# Patient Record
Sex: FEMALE | Race: WHITE | NOT HISPANIC OR LATINO | Employment: OTHER | URBAN - METROPOLITAN AREA
[De-identification: names, ages, dates, MRNs, and addresses within clinical notes are randomized per-mention and may not be internally consistent; named-entity substitution may affect disease eponyms.]

---

## 2020-09-23 ENCOUNTER — APPOINTMENT (EMERGENCY)
Dept: RADIOLOGY | Facility: HOSPITAL | Age: 35
DRG: 420 | End: 2020-09-23
Payer: COMMERCIAL

## 2020-09-23 ENCOUNTER — HOSPITAL ENCOUNTER (INPATIENT)
Facility: HOSPITAL | Age: 35
LOS: 2 days | Discharge: HOME | DRG: 420 | End: 2020-09-25
Attending: EMERGENCY MEDICINE | Admitting: INTERNAL MEDICINE
Payer: COMMERCIAL

## 2020-09-23 DIAGNOSIS — E87.2 INCREASED ANION GAP METABOLIC ACIDOSIS: ICD-10-CM

## 2020-09-23 DIAGNOSIS — E83.51 HYPOCALCEMIA: ICD-10-CM

## 2020-09-23 DIAGNOSIS — E11.10 DKA (DIABETIC KETOACIDOSES): Primary | ICD-10-CM

## 2020-09-23 LAB
ALBUMIN SERPL BCP-MCNC: 4.9 G/DL (ref 3.5–5)
ALP SERPL-CCNC: 113 U/L (ref 46–116)
ALT SERPL W P-5'-P-CCNC: 28 U/L (ref 12–78)
ANION GAP SERPL CALCULATED.3IONS-SCNC: 11 MMOL/L (ref 4–13)
ANION GAP SERPL CALCULATED.3IONS-SCNC: 16 MMOL/L (ref 4–13)
ANION GAP SERPL CALCULATED.3IONS-SCNC: 22 MMOL/L (ref 4–13)
AST SERPL W P-5'-P-CCNC: 19 U/L (ref 5–45)
BACTERIA UR QL AUTO: NORMAL /HPF
BASE EX.OXY STD BLDV CALC-SCNC: 82.2 % (ref 60–80)
BASE EXCESS BLDV CALC-SCNC: -12.8 MMOL/L
BASOPHILS # BLD AUTO: 0.03 THOUSANDS/ΜL (ref 0–0.1)
BASOPHILS NFR BLD AUTO: 0 % (ref 0–1)
BETA-HYDROXYBUTYRATE: 3.9 MMOL/L
BILIRUB SERPL-MCNC: 0.49 MG/DL (ref 0.2–1)
BILIRUB UR QL STRIP: ABNORMAL
BUN SERPL-MCNC: 14 MG/DL (ref 5–25)
BUN SERPL-MCNC: 19 MG/DL (ref 5–25)
BUN SERPL-MCNC: 20 MG/DL (ref 5–25)
CALCIUM SERPL-MCNC: 6.9 MG/DL (ref 8.3–10.1)
CALCIUM SERPL-MCNC: 8 MG/DL (ref 8.3–10.1)
CALCIUM SERPL-MCNC: 9.6 MG/DL (ref 8.3–10.1)
CHLORIDE SERPL-SCNC: 106 MMOL/L (ref 100–108)
CHLORIDE SERPL-SCNC: 111 MMOL/L (ref 100–108)
CHLORIDE SERPL-SCNC: 99 MMOL/L (ref 100–108)
CLARITY UR: CLEAR
CO2 SERPL-SCNC: 15 MMOL/L (ref 21–32)
CO2 SERPL-SCNC: 17 MMOL/L (ref 21–32)
CO2 SERPL-SCNC: 19 MMOL/L (ref 21–32)
COLOR UR: YELLOW
CREAT SERPL-MCNC: 0.6 MG/DL (ref 0.6–1.3)
CREAT SERPL-MCNC: 0.7 MG/DL (ref 0.6–1.3)
CREAT SERPL-MCNC: 1.09 MG/DL (ref 0.6–1.3)
D DIMER PPP FEU-MCNC: <0.27 UG/ML FEU
EOSINOPHIL # BLD AUTO: 0 THOUSAND/ΜL (ref 0–0.61)
EOSINOPHIL NFR BLD AUTO: 0 % (ref 0–6)
ERYTHROCYTE [DISTWIDTH] IN BLOOD BY AUTOMATED COUNT: 12.5 % (ref 11.6–15.1)
EXT PREG TEST URINE: NEGATIVE
EXT. CONTROL ED NAV: NORMAL
GFR SERPL CREATININE-BSD FRML MDRD: 114 ML/MIN/1.73SQ M
GFR SERPL CREATININE-BSD FRML MDRD: 120 ML/MIN/1.73SQ M
GFR SERPL CREATININE-BSD FRML MDRD: 67 ML/MIN/1.73SQ M
GLUCOSE SERPL-MCNC: 114 MG/DL (ref 65–140)
GLUCOSE SERPL-MCNC: 124 MG/DL (ref 65–140)
GLUCOSE SERPL-MCNC: 128 MG/DL (ref 65–140)
GLUCOSE SERPL-MCNC: 138 MG/DL (ref 65–140)
GLUCOSE SERPL-MCNC: 170 MG/DL (ref 65–140)
GLUCOSE SERPL-MCNC: 187 MG/DL (ref 65–140)
GLUCOSE SERPL-MCNC: 223 MG/DL (ref 65–140)
GLUCOSE UR STRIP-MCNC: ABNORMAL MG/DL
HCG SERPL QL: NEGATIVE
HCO3 BLDV-SCNC: 13.1 MMOL/L (ref 24–30)
HCT VFR BLD AUTO: 49.2 % (ref 34.8–46.1)
HGB BLD-MCNC: 16.1 G/DL (ref 11.5–15.4)
HGB UR QL STRIP.AUTO: ABNORMAL
IMM GRANULOCYTES # BLD AUTO: 0.06 THOUSAND/UL (ref 0–0.2)
IMM GRANULOCYTES NFR BLD AUTO: 1 % (ref 0–2)
KETONES UR STRIP-MCNC: ABNORMAL MG/DL
LEUKOCYTE ESTERASE UR QL STRIP: NEGATIVE
LIPASE SERPL-CCNC: 529 U/L (ref 73–393)
LYMPHOCYTES # BLD AUTO: 1.01 THOUSANDS/ΜL (ref 0.6–4.47)
LYMPHOCYTES NFR BLD AUTO: 8 % (ref 14–44)
MAGNESIUM SERPL-MCNC: 1.4 MG/DL (ref 1.6–2.6)
MAGNESIUM SERPL-MCNC: 1.8 MG/DL (ref 1.6–2.6)
MCH RBC QN AUTO: 27.2 PG (ref 26.8–34.3)
MCHC RBC AUTO-ENTMCNC: 32.7 G/DL (ref 31.4–37.4)
MCV RBC AUTO: 83 FL (ref 82–98)
MONOCYTES # BLD AUTO: 0.26 THOUSAND/ΜL (ref 0.17–1.22)
MONOCYTES NFR BLD AUTO: 2 % (ref 4–12)
NEUTROPHILS # BLD AUTO: 10.99 THOUSANDS/ΜL (ref 1.85–7.62)
NEUTS SEG NFR BLD AUTO: 89 % (ref 43–75)
NITRITE UR QL STRIP: NEGATIVE
NON-SQ EPI CELLS URNS QL MICRO: NORMAL /HPF
NRBC BLD AUTO-RTO: 0 /100 WBCS
O2 CT BLDV-SCNC: 19 ML/DL
PCO2 BLDV: 31.2 MM HG (ref 42–50)
PH BLDV: 7.24 [PH] (ref 7.3–7.4)
PH UR STRIP.AUTO: 5.5 [PH]
PHOSPHATE SERPL-MCNC: 1.9 MG/DL (ref 2.7–4.5)
PLATELET # BLD AUTO: 416 THOUSANDS/UL (ref 149–390)
PMV BLD AUTO: 9.9 FL (ref 8.9–12.7)
PO2 BLDV: 50.3 MM HG (ref 35–45)
POTASSIUM SERPL-SCNC: 3.5 MMOL/L (ref 3.5–5.3)
POTASSIUM SERPL-SCNC: 4.1 MMOL/L (ref 3.5–5.3)
POTASSIUM SERPL-SCNC: 4.1 MMOL/L (ref 3.5–5.3)
PROT SERPL-MCNC: 9.4 G/DL (ref 6.4–8.2)
PROT UR STRIP-MCNC: ABNORMAL MG/DL
RBC # BLD AUTO: 5.92 MILLION/UL (ref 3.81–5.12)
RBC #/AREA URNS AUTO: NORMAL /HPF
SODIUM SERPL-SCNC: 136 MMOL/L (ref 136–145)
SODIUM SERPL-SCNC: 139 MMOL/L (ref 136–145)
SODIUM SERPL-SCNC: 141 MMOL/L (ref 136–145)
SP GR UR STRIP.AUTO: >=1.03 (ref 1–1.03)
UROBILINOGEN UR QL STRIP.AUTO: 0.2 E.U./DL
WBC # BLD AUTO: 12.35 THOUSAND/UL (ref 4.31–10.16)
WBC #/AREA URNS AUTO: NORMAL /HPF

## 2020-09-23 PROCEDURE — 81001 URINALYSIS AUTO W/SCOPE: CPT | Performed by: PHYSICIAN ASSISTANT

## 2020-09-23 PROCEDURE — 83735 ASSAY OF MAGNESIUM: CPT | Performed by: PHYSICIAN ASSISTANT

## 2020-09-23 PROCEDURE — 83690 ASSAY OF LIPASE: CPT | Performed by: PHYSICIAN ASSISTANT

## 2020-09-23 PROCEDURE — 82010 KETONE BODYS QUAN: CPT | Performed by: PHYSICIAN ASSISTANT

## 2020-09-23 PROCEDURE — 85379 FIBRIN DEGRADATION QUANT: CPT | Performed by: PHYSICIAN ASSISTANT

## 2020-09-23 PROCEDURE — 96361 HYDRATE IV INFUSION ADD-ON: CPT

## 2020-09-23 PROCEDURE — 84100 ASSAY OF PHOSPHORUS: CPT | Performed by: INTERNAL MEDICINE

## 2020-09-23 PROCEDURE — 80048 BASIC METABOLIC PNL TOTAL CA: CPT | Performed by: INTERNAL MEDICINE

## 2020-09-23 PROCEDURE — 82948 REAGENT STRIP/BLOOD GLUCOSE: CPT

## 2020-09-23 PROCEDURE — 99285 EMERGENCY DEPT VISIT HI MDM: CPT

## 2020-09-23 PROCEDURE — 36415 COLL VENOUS BLD VENIPUNCTURE: CPT | Performed by: PHYSICIAN ASSISTANT

## 2020-09-23 PROCEDURE — 96374 THER/PROPH/DIAG INJ IV PUSH: CPT

## 2020-09-23 PROCEDURE — 83735 ASSAY OF MAGNESIUM: CPT | Performed by: INTERNAL MEDICINE

## 2020-09-23 PROCEDURE — 99223 1ST HOSP IP/OBS HIGH 75: CPT | Performed by: INTERNAL MEDICINE

## 2020-09-23 PROCEDURE — 82805 BLOOD GASES W/O2 SATURATION: CPT | Performed by: PHYSICIAN ASSISTANT

## 2020-09-23 PROCEDURE — 71046 X-RAY EXAM CHEST 2 VIEWS: CPT

## 2020-09-23 PROCEDURE — 80048 BASIC METABOLIC PNL TOTAL CA: CPT | Performed by: PHYSICIAN ASSISTANT

## 2020-09-23 PROCEDURE — 84703 CHORIONIC GONADOTROPIN ASSAY: CPT | Performed by: INTERNAL MEDICINE

## 2020-09-23 PROCEDURE — 81025 URINE PREGNANCY TEST: CPT | Performed by: PHYSICIAN ASSISTANT

## 2020-09-23 PROCEDURE — 85025 COMPLETE CBC W/AUTO DIFF WBC: CPT | Performed by: PHYSICIAN ASSISTANT

## 2020-09-23 PROCEDURE — 80053 COMPREHEN METABOLIC PANEL: CPT | Performed by: PHYSICIAN ASSISTANT

## 2020-09-23 PROCEDURE — 99291 CRITICAL CARE FIRST HOUR: CPT | Performed by: EMERGENCY MEDICINE

## 2020-09-23 RX ORDER — POTASSIUM CHLORIDE 20 MEQ/1
40 TABLET, EXTENDED RELEASE ORAL ONCE
Status: COMPLETED | OUTPATIENT
Start: 2020-09-23 | End: 2020-09-23

## 2020-09-23 RX ORDER — SODIUM CHLORIDE 9 MG/ML
500 INJECTION, SOLUTION INTRAVENOUS CONTINUOUS
Status: DISCONTINUED | OUTPATIENT
Start: 2020-09-23 | End: 2020-09-24

## 2020-09-23 RX ORDER — NICOTINE POLACRILEX 2 MG
GUM BUCCAL
COMMUNITY

## 2020-09-23 RX ORDER — ONDANSETRON 2 MG/ML
4 INJECTION INTRAMUSCULAR; INTRAVENOUS EVERY 4 HOURS PRN
Status: DISCONTINUED | OUTPATIENT
Start: 2020-09-23 | End: 2020-09-25 | Stop reason: HOSPADM

## 2020-09-23 RX ORDER — SODIUM CHLORIDE 9 MG/ML
2000 INJECTION, SOLUTION INTRAVENOUS CONTINUOUS
Status: DISPENSED | OUTPATIENT
Start: 2020-09-23 | End: 2020-09-23

## 2020-09-23 RX ORDER — ONDANSETRON 2 MG/ML
4 INJECTION INTRAMUSCULAR; INTRAVENOUS ONCE
Status: COMPLETED | OUTPATIENT
Start: 2020-09-23 | End: 2020-09-23

## 2020-09-23 RX ORDER — MAGNESIUM SULFATE HEPTAHYDRATE 40 MG/ML
4 INJECTION, SOLUTION INTRAVENOUS ONCE
Status: COMPLETED | OUTPATIENT
Start: 2020-09-23 | End: 2020-09-24

## 2020-09-23 RX ORDER — DEXTROSE AND SODIUM CHLORIDE 5; .9 G/100ML; G/100ML
100 INJECTION, SOLUTION INTRAVENOUS CONTINUOUS
Status: DISCONTINUED | OUTPATIENT
Start: 2020-09-23 | End: 2020-09-24

## 2020-09-23 RX ORDER — DIPHENOXYLATE HYDROCHLORIDE AND ATROPINE SULFATE 2.5; .025 MG/1; MG/1
1 TABLET ORAL DAILY
COMMUNITY

## 2020-09-23 RX ORDER — SODIUM CHLORIDE 9 MG/ML
250 INJECTION, SOLUTION INTRAVENOUS CONTINUOUS
Status: DISCONTINUED | OUTPATIENT
Start: 2020-09-24 | End: 2020-09-24

## 2020-09-23 RX ORDER — MAGNESIUM SULFATE HEPTAHYDRATE 40 MG/ML
2 INJECTION, SOLUTION INTRAVENOUS ONCE
Status: COMPLETED | OUTPATIENT
Start: 2020-09-23 | End: 2020-09-23

## 2020-09-23 RX ORDER — ONDANSETRON 2 MG/ML
INJECTION INTRAMUSCULAR; INTRAVENOUS
Status: COMPLETED
Start: 2020-09-23 | End: 2020-09-23

## 2020-09-23 RX ADMIN — SODIUM CHLORIDE 2000 ML/HR: 0.9 INJECTION, SOLUTION INTRAVENOUS at 19:23

## 2020-09-23 RX ADMIN — SODIUM CHLORIDE 1000 ML: 0.9 INJECTION, SOLUTION INTRAVENOUS at 16:25

## 2020-09-23 RX ADMIN — DEXTROSE AND SODIUM CHLORIDE 250 ML/HR: 5; .9 INJECTION, SOLUTION INTRAVENOUS at 19:57

## 2020-09-23 RX ADMIN — ONDANSETRON 4 MG: 2 INJECTION INTRAMUSCULAR; INTRAVENOUS at 16:27

## 2020-09-23 RX ADMIN — MAGNESIUM SULFATE IN WATER 2 G: 40 INJECTION, SOLUTION INTRAVENOUS at 21:40

## 2020-09-23 RX ADMIN — SODIUM CHLORIDE 500 ML/HR: 0.9 INJECTION, SOLUTION INTRAVENOUS at 22:07

## 2020-09-23 RX ADMIN — MAGNESIUM SULFATE IN WATER 4 G: 40 INJECTION, SOLUTION INTRAVENOUS at 22:38

## 2020-09-23 RX ADMIN — ONDANSETRON 4 MG: 2 INJECTION INTRAMUSCULAR; INTRAVENOUS at 22:36

## 2020-09-23 RX ADMIN — SODIUM CHLORIDE 5.7 UNITS/HR: 9 INJECTION, SOLUTION INTRAVENOUS at 19:55

## 2020-09-23 RX ADMIN — SODIUM CHLORIDE 1000 ML: 0.9 INJECTION, SOLUTION INTRAVENOUS at 17:57

## 2020-09-23 RX ADMIN — SODIUM PHOSPHATE, MONOBASIC, MONOHYDRATE 21 MMOL: 276; 142 INJECTION, SOLUTION INTRAVENOUS at 21:46

## 2020-09-23 RX ADMIN — SODIUM CHLORIDE 500 ML/HR: 0.9 INJECTION, SOLUTION INTRAVENOUS at 21:23

## 2020-09-23 RX ADMIN — POTASSIUM CHLORIDE 40 MEQ: 1500 TABLET, EXTENDED RELEASE ORAL at 21:25

## 2020-09-23 NOTE — ED ATTENDING ATTESTATION
9/23/2020  IKinjal DO, saw and evaluated the patient  I have discussed the patient with the resident/non-physician practitioner and agree with the resident's/non-physician practitioner's findings, Plan of Care, and MDM as documented in the resident's/non-physician practitioner's note, except where noted  All available labs and Radiology studies were reviewed  I was present for key portions of any procedure(s) performed by the resident/non-physician practitioner and I was immediately available to provide assistance  At this point I agree with the current assessment done in the Emergency Department  I have conducted an independent evaluation of this patient a history and physical is as follows:    ED Course    30-year-old female with a history of insulin-dependent diabetes presents the emergency department for evaluation of hyperglycemia, nausea vomiting and diarrhea  Patient states that she was diagnosed with diabetes at the age of 5  She has had good control of her blood sugars with averages between 100 in 200  She denies previous history of diabetic ketoacidosis  She wears an insulin pump  Patient states that she began to have fatigue with nausea vomiting yesterday  When she checked her blood sugar was greater than 400  She has had 1 episode of diarrhea  She has associated mild generalized abdominal discomfort  She took over-the-counter medications without relief  She denies fevers or chills  She does have increased thirst and increased urination  Past medical history:  Insulin-dependent diabetes    Physical exam:  Patient has dry mucous membranes  Acetone sent is present on her breath  She is mildly tachypneic  Tachycardia is present  Heart is regular  Lungs are clear to auscultation bilaterally  Abdomen is soft with diffuse tenderness but no rebound or guarding  Hyperactive bowel sounds are present  Patient has no focal neurologic deficits    She has 5/5 strength in all 4 extremities  Plan:  Suspect patient is experiencing symptoms secondary to DKA  Will start IV fluids  Patient's blood sugar was less than 250 on initial testing  Patient will be placed on insulin drip and glucose containing IV fluids until her anion gap closes  Potassium is within normal limits    Will discuss case with Critical Care and admit the patient to the hospital         Critical Care Time  CriticalCare Time  Performed by: Morgan August DO  Authorized by: Morgan August DO     Critical care provider statement:     Critical care time (minutes):  30    Critical care time was exclusive of:  Separately billable procedures and treating other patients and teaching time    Critical care was necessary to treat or prevent imminent or life-threatening deterioration of the following conditions:  Dehydration    Critical care was time spent personally by me on the following activities:  Blood draw for specimens, obtaining history from patient or surrogate, development of treatment plan with patient or surrogate, discussions with consultants, evaluation of patient's response to treatment, examination of patient, ordering and review of laboratory studies, ordering and performing treatments and interventions, ordering and review of radiographic studies, re-evaluation of patient's condition and review of old charts    I assumed direction of critical care for this patient from another provider in my specialty: no

## 2020-09-23 NOTE — ASSESSMENT & PLAN NOTE
· POA, lipase elevated at 529 on admission  Likely secondary to nausea/vomiting  · Currently without abdominal pain or tenderness  · Will continue to monitor, continue IV fluids    Diabetic clear liquid diet for now

## 2020-09-23 NOTE — H&P
H&P- Johann Wren 1985, 37 y o  female MRN: [id number]    Unit/Bed#: FT 03 Encounter: [id number]    Primary Care Provider: No primary care provider on file  Date and time admitted to hospital: 9/23/2020  3:54 PM    * Diabetic ketoacidosis without coma associated with type 1 diabetes mellitus [facility])  Assessment & Plan  Lab Results   Component Value Date    HGBA1C 8 9 (H) 03/11/2020     Recent Labs     09/23/20  1739   POCGLU 187*     Blood Sugar Average: Last 72 hrs:  · (P) 187   · Patient with history of type 1 diabetes on insulin pump presenting with a days history nausea, vomiting  Also reports uncontrolled blood glucose since onset  · Workup in the ED revealed elevated blood glucose in the 200s with positive beta hydroxybutyrate, elevated anion gap at 22  · She will be admitted to step-down level 2, start on insulin infusion DKA protocol, monitor electrolytes closely and replete as indicated per protocol  · Will discontinue insulin pump while on infusion protocol  · She follows with endocrinology at [facility]  Will obtain endocrinology eval    Elevated lipase  Assessment & Plan  · POA, lipase elevated at 529 on admission  Likely secondary to nausea/vomiting  · Currently without abdominal pain or tenderness  · Will continue to monitor, continue IV fluids  Diabetic clear liquid diet for now    VTE Prophylaxis:   Low risk, ambulate     Code Status:  Full code    Anticipated Length of Stay:  Patient will be admitted on an Inpatient basis with an anticipated length of stay of  > 2 midnights  Justification for Hospital Stay:  DKA    Chief Complaint:     Nausea with vomiting and generalized malaise    History of Present Illness:  Johann Wren is a 37 y o  female who presents with 1 day history of generalized malaise with associated nausea and vomiting  She denies any abdominal pain  She also reports her sugars have been elevated above her normal baseline    She initially touch there was a problem with her insulin pump and proceeded to change it  She continue to have symptoms with elevated blood glucose to the 400s  She denied any hematemesis, no diarrhea, no constipation  She denies any fever or chills  She attempted some Pepto-Bismol without relief  Patient subsequently presented to the ED and was noted to be in DKA  She denied any sick contacts, no recent travel  She denies any URI or  symptoms  At the time of my evaluation, she reported feeling overall improved  Review of Systems   Constitutional: Positive for fatigue  Negative for chills, diaphoresis and fever  HENT: Negative  Respiratory: Negative for cough, chest tightness, shortness of breath and wheezing  Cardiovascular: Negative  Gastrointestinal: Positive for nausea and vomiting  Negative for abdominal pain, constipation and diarrhea  Genitourinary: Negative  Musculoskeletal: Negative  Neurological: Negative  Psychiatric/Behavioral: Negative  All other systems reviewed and are negative  Past Medical History:   Diagnosis Date    Diabetes mellitus ([facility] )        History reviewed  No pertinent surgical history  Family History:  Family History   Problem Relation Age of Onset    Lung disease Maternal Grandfather        Home Meds:  all medications and allergies reviewed    Allergies:    Allergies   Allergen Reactions    Augmentin [Amoxicillin-Pot Clavulanate] Rash     Marital Status: Single     Social History     Substance and Sexual Activity   Alcohol Use No     Social History     Tobacco Use   Smoking Status Never Smoker   Smokeless Tobacco Never Used     Social History     Substance and Sexual Activity   Drug Use No     Physical Exam:   Vitals:   Blood Pressure: 112/77 (09/23/20 1735)  Pulse: 99 (09/23/20 1735)  Temperature: 97 7 °F (36 5 °C) (09/23/20 1517)  Temp Source: Oral (09/23/20 1517)  Respirations: 18 (09/23/20 1735)  Weight - Scale: 56 5 kg (124 lb 9 6 oz) (09/23/20 1517)  SpO2: 100 % (09/23/20 1735)    Physical Exam  Vitals signs reviewed  Constitutional:       General: She is not in acute distress  Appearance: Normal appearance  She is not ill-appearing, toxic-appearing or diaphoretic  HENT:      Head: Normocephalic and atraumatic  Nose: Nose normal  No congestion or rhinorrhea  Mouth/Throat:      Mouth: Mucous membranes are dry  Eyes:      General: No scleral icterus  Right eye: No discharge  Left eye: No discharge  Extraocular Movements: Extraocular movements intact  Neck:      Musculoskeletal: Normal range of motion and neck supple  Cardiovascular:      Rate and Rhythm: Regular rhythm  Tachycardia present  Heart sounds: No murmur  Pulmonary:      Effort: Pulmonary effort is normal  No respiratory distress  Breath sounds: Normal breath sounds  No wheezing, rhonchi or rales  Abdominal:      General: There is no distension  Palpations: Abdomen is soft  Tenderness: There is no abdominal tenderness  Musculoskeletal: Normal range of motion  Right lower leg: No edema  Left lower leg: No edema  Skin:     General: Skin is warm and dry  Comments: + vitiligo   Neurological:      General: No focal deficit present  Mental Status: She is alert and oriented to person, place, and time  Mental status is at baseline  Cranial Nerves: No cranial nerve deficit  Lab Results: I have personally reviewed pertinent reports        Results from last 7 days   Lab Units 09/23/20  1624   WBC Thousand/uL 12 35*   HEMOGLOBIN g/dL 16 1*   HEMATOCRIT % 49 2*   PLATELETS Thousands/uL 416*   NEUTROS PCT % 89*   LYMPHS PCT % 8*   MONOS PCT % 2*   EOS PCT % 0     Results from last 7 days   Lab Units 09/23/20  1806 09/23/20  1624   POTASSIUM mmol/L 4 1 4 1   CHLORIDE mmol/L 106 99*   CO2 mmol/L 17* 15*   BUN mg/dL 19 20   CREATININE mg/dL 0 70 1 09   CALCIUM mg/dL 8 0* 9 6   ALK PHOS U/L  --  113   ALT U/L  --  28   AST U/L --  19           Imaging: I have personally reviewed pertinent reports  Xr Chest 2 Views    Result Date: 9/23/2020  Narrative: CHEST INDICATION:   SOB  COMPARISON:  None EXAM PERFORMED/VIEWS:  XR CHEST PA & LATERAL FINDINGS: Cardiomediastinal silhouette appears unremarkable  The lungs are clear  No pneumothorax or pleural effusion  Osseous structures appear within normal limits for patient age  Impression: No acute cardiopulmonary disease  Workstation performed: [id number]     ** Please Note: Dragon 360 Dictation voice to text software may have been used in the creation of this document   **

## 2020-09-23 NOTE — ASSESSMENT & PLAN NOTE
Lab Results   Component Value Date    HGBA1C 8 9 (H) 03/11/2020     Recent Labs     09/23/20  1739   POCGLU 187*     Blood Sugar Average: Last 72 hrs:  · (P) 187   · Patient with history of type 1 diabetes on insulin pump presenting with a days history nausea, vomiting  Also reports uncontrolled blood glucose since onset  · Workup in the ED revealed elevated blood glucose in the 200s with positive beta hydroxybutyrate, elevated anion gap at 22  · She will be admitted to step-down level 2, start on insulin infusion DKA protocol, monitor electrolytes closely and replete as indicated per protocol  · Will discontinue insulin pump while on infusion protocol  · She follows with endocrinology at [facility]    Will obtain endocrinology eval

## 2020-09-23 NOTE — ED PROVIDER NOTES
History  Chief Complaint   Patient presents with    Vomiting     PT reports "not feeling well since yesterday and sugars all over the place (400's) and today started vomiting"     Pt is a 34yo female with a PMH significant for type 1 DM on an insulin pump who presents to the ER with complaints of nausea and vomiting x 1 day  Patient states her symptoms began as fatigue and exhaustion yesterday morning  She checked her blood sugar when she woke and noted that it was elevated to the 400s  Patient states that her blood sugars are normally very well controlled within the 100 range  Patient's exhaustion continued throughout the day yesterday and her blood sugars remain high  She woke up this morning feeling nauseous and had several episodes of vomiting and 1 episode of diarrhea  Patient also admits to generalized, nonspecific abdominal pain  Patient tried a dose of Pepto-Bismol without relief of symptoms  Patient states she was unable to get her blood sugars below 230 today  Patient states she called her family doctor who advised she come to the ER for further evaluation  Upon arrival to the ER, the patient is complaining of nausea  She also states she has some shortness of breath  Upon questioning the patient states she has been waking up several times throughout the night to urinate  Admits to increased thirst   Pt denies any fevers, hematochezia, hematemesis, chest pain, palpitations, dizziness, lightheadedness, episodes of passing out, rashes, leg pain, leg swelling  History provided by:  Patient   used: No        Prior to Admission Medications   Prescriptions Last Dose Informant Patient Reported? Taking? Biotin 1 MG CAPS   Yes Yes   Sig: Take by mouth   insulin aspart (NovoLOG) 100 units/mL injection   Yes Yes   Sig: Inject under the skin continuous   Via pump   insulin lispro (Admelog) 100 units/mL injection   Yes Yes   Sig: INJECT 60 UNITS VIA INSULIN PUMP DAILY multivitamin (THERAGRAN) TABS   Yes Yes   Sig: Take 1 tablet by mouth daily      Facility-Administered Medications: None       Past Medical History:   Diagnosis Date    Diabetes mellitus ([address] )        History reviewed  No pertinent surgical history  Family History   Problem Relation Age of Onset    Lung disease Maternal Grandfather      I have reviewed and agree with the history as documented  E-Cigarette/Vaping    E-Cigarette Use Never User      E-Cigarette/Vaping Substances     Social History     Tobacco Use    Smoking status: Never Smoker    Smokeless tobacco: Never Used   Substance Use Topics    Alcohol use: No    Drug use: No       Review of Systems   Constitutional: Positive for appetite change, chills and fatigue  Negative for activity change, diaphoresis, fever and unexpected weight change  HENT: Negative for congestion, facial swelling, postnasal drip, rhinorrhea, sinus pressure, sinus pain, sneezing, sore throat and trouble swallowing  Eyes: Negative for photophobia and visual disturbance  Respiratory: Positive for shortness of breath  Negative for cough, chest tightness and wheezing  Cardiovascular: Negative for chest pain, palpitations and leg swelling  Gastrointestinal: Positive for abdominal pain, diarrhea, nausea and vomiting  Negative for abdominal distention  Musculoskeletal: Positive for myalgias  Negative for back pain, gait problem, neck pain and neck stiffness  Skin: Negative for pallor, rash and wound  Neurological: Negative for dizziness, syncope, weakness, light-headedness and headaches  Psychiatric/Behavioral: Negative for agitation, behavioral problems, confusion and decreased concentration  The patient is not nervous/anxious  Physical Exam  Physical Exam  Constitutional:       General: She is in acute distress  Appearance: Normal appearance  She is normal weight  She is not ill-appearing, toxic-appearing or diaphoretic        Comments: Stanton Sterling female on the stretcher   In mild distress, appearing uncomfortable, tear and anxious over her symptoms   Non-toxic appearing    HENT:      Head: Normocephalic and atraumatic  Nose: Nose normal  No congestion or rhinorrhea  Mouth/Throat:      Mouth: Mucous membranes are moist       Pharynx: Oropharynx is clear  No oropharyngeal exudate or posterior oropharyngeal erythema  Eyes:      General: No scleral icterus  Right eye: No discharge  Left eye: No discharge  Extraocular Movements: Extraocular movements intact  Conjunctiva/sclera: Conjunctivae normal       Pupils: Pupils are equal, round, and reactive to light  Neck:      Musculoskeletal: Normal range of motion and neck supple  No neck rigidity or muscular tenderness  Cardiovascular:      Rate and Rhythm: Regular rhythm  Tachycardia present  Pulses: Normal pulses  Heart sounds: No murmur  Comments: Tachycardic  Regular rhythm    Pulmonary:      Effort: Pulmonary effort is normal  No respiratory distress  Breath sounds: Normal breath sounds  No wheezing, rhonchi or rales  Comments: No respiratory distress, no accessory muscle use   Breath sounds noted to be clear and equal lorene   No wheezes, rhonchi, rales   Abdominal:      General: Abdomen is flat  There is no distension  Palpations: Abdomen is soft  Tenderness: There is no abdominal tenderness  There is no right CVA tenderness, left CVA tenderness, guarding or rebound  Comments: Ab soft, NT, ND  No rebound or guarding    Musculoskeletal: Normal range of motion  General: No swelling, tenderness, deformity or signs of injury  Right lower leg: No edema  Left lower leg: No edema  Comments: lorene LE without edema or tenderness   Skin:     General: Skin is warm and dry  Capillary Refill: Capillary refill takes less than 2 seconds  Findings: No bruising, erythema, lesion or rash        Comments: No rashes Neurological:      General: No focal deficit present  Mental Status: She is alert and oriented to person, place, and time  Mental status is at baseline  Cranial Nerves: No cranial nerve deficit  Sensory: No sensory deficit  Motor: No weakness  Gait: Gait normal    Psychiatric:         Behavior: Behavior normal          Thought Content:  Thought content normal          Judgment: Judgment normal       Comments: Anxious and tearful          Vital Signs  ED Triage Vitals [09/23/20 1517]   Temperature Pulse Respirations Blood Pressure SpO2   97 7 °F (36 5 °C) (!) 127 18 119/77 99 %      Temp Source Heart Rate Source Patient Position - Orthostatic VS BP Location FiO2 (%)   Oral Monitor Sitting Left arm --      Pain Score       3           Vitals:    09/24/20 0022 09/24/20 0300 09/24/20 0800 09/24/20 0940   BP:  98/54 100/63 120/80   Pulse: 85 80 85 84   Patient Position - Orthostatic VS:             Visual Acuity      ED Medications  Medications   multivitamin-minerals (CENTRUM) tablet 1 tablet (1 tablet Oral Given 9/24/20 0840)   sodium chloride 0 9 % infusion (0 mL/hr Intravenous Stopped 9/23/20 2121)   dextrose 5 % and sodium chloride 0 9 % infusion (100 mL/hr Intravenous Rate/Dose Change 9/24/20 0628)   ondansetron (ZOFRAN) injection 4 mg (4 mg Intravenous Given 9/24/20 1000)   insulin regular (HumuLIN R,NovoLIN R) 1 Units/mL in sodium chloride 0 9 % 100 mL infusion (0 Units/hr Intravenous Stopped 9/24/20 1314)   insulin lispro (HumaLOG) FOR PUMP REFILLS 100 Units (has no administration in time range)   acetaminophen (TYLENOL) tablet 650 mg (650 mg Oral Given 9/24/20 1346)   sodium chloride 0 9 % bolus 1,000 mL (0 mL Intravenous Stopped 9/23/20 1756)   ondansetron (ZOFRAN) injection 4 mg (4 mg Intravenous Given 9/23/20 1627)   sodium chloride 0 9 % bolus 1,000 mL (0 mL Intravenous Stopped 9/23/20 1922)   potassium chloride (K-DUR,KLOR-CON) CR tablet 40 mEq (40 mEq Oral Given 9/23/20 2125) magnesium sulfate 4 g/100 mL IVPB (premix) 4 g (0 g Intravenous Stopped 9/24/20 0606)     Followed by   magnesium sulfate 2 g/50 mL IVPB (premix) 2 g (0 g Intravenous Stopped 9/23/20 2237)   sodium phosphate 21 mmol in dextrose 5 % 250 mL Infusion (0 mmol Intravenous Stopped 9/24/20 0150)   potassium chloride 20 mEq IVPB (premix) (0 mEq Intravenous Stopped 9/24/20 0606)   sodium phosphate 21 mmol in dextrose 5 % 250 mL Infusion (21 mmol Intravenous New Bag 9/24/20 0145)   dextrose 50 % IV solution 25 mL (25 mL Intravenous Given 9/24/20 0656)       Diagnostic Studies  Results Reviewed     Procedure Component Value Units Date/Time    Basic metabolic panel [[id number]]  (Abnormal) Collected:  09/24/20 0353    Lab Status:  Final result Specimen:  Blood from Arm, Left Updated:  09/24/20 0426     Sodium 139 mmol/L      Potassium 3 8 mmol/L      Chloride 111 mmol/L      CO2 21 mmol/L      ANION GAP 7 mmol/L      BUN 6 mg/dL      Creatinine 0 52 mg/dL      Glucose 88 mg/dL      Calcium 6 0 mg/dL      eGFR 126 ml/min/1 73sq m     Narrative:       [place] guidelines for Chronic Kidney Disease (CKD):     Stage 1 with normal or high GFR (GFR > 90 mL/min/1 73 square meters)    Stage 2 Mild CKD (GFR = 60-89 mL/min/1 73 square meters)    Stage 3A Moderate CKD (GFR = 45-59 mL/min/1 73 square meters)    Stage 3B Moderate CKD (GFR = 30-44 mL/min/1 73 square meters)    Stage 4 Severe CKD (GFR = 15-29 mL/min/1 73 square meters)    Stage 5 End Stage CKD (GFR <15 mL/min/1 73 square meters)  Note: GFR calculation is accurate only with a steady state creatinine    Magnesium [[id number]]  (Abnormal) Collected:  09/24/20 0353    Lab Status:  Final result Specimen:  Blood from Arm, Left Updated:  09/24/20 0426     Magnesium 2 7 mg/dL     Phosphorus [[id number]]  (Abnormal) Collected:  09/24/20 0353    Lab Status:  Final result Specimen:  Blood from Arm, Left Updated:  09/24/20 0426     Phosphorus 2 4 mg/dL Magnesium [[id number]]  (Abnormal) Collected:  09/23/20 2351    Lab Status:  Final result Specimen:  Blood from Arm, Left Updated:  09/24/20 0021     Magnesium 3 3 mg/dL     Basic metabolic panel [[id number]]  (Abnormal) Collected:  09/23/20 2351    Lab Status:  Final result Specimen:  Blood from Arm, Left Updated:  09/24/20 0018     Sodium 141 mmol/L      Potassium 2 9 mmol/L      Chloride 110 mmol/L      CO2 19 mmol/L      ANION GAP 12 mmol/L      BUN 10 mg/dL      Creatinine 0 68 mg/dL      Glucose 150 mg/dL      Calcium 6 5 mg/dL      eGFR 115 ml/min/1 73sq m     Narrative:       [place] guidelines for Chronic Kidney Disease (CKD):     Stage 1 with normal or high GFR (GFR > 90 mL/min/1 73 square meters)    Stage 2 Mild CKD (GFR = 60-89 mL/min/1 73 square meters)    Stage 3A Moderate CKD (GFR = 45-59 mL/min/1 73 square meters)    Stage 3B Moderate CKD (GFR = 30-44 mL/min/1 73 square meters)    Stage 4 Severe CKD (GFR = 15-29 mL/min/1 73 square meters)    Stage 5 End Stage CKD (GFR <15 mL/min/1 73 square meters)  Note: GFR calculation is accurate only with a steady state creatinine    Phosphorus [[id number]]  (Abnormal) Collected:  09/23/20 2351    Lab Status:  Final result Specimen:  Blood from Arm, Left Updated:  09/24/20 0018     Phosphorus 1 7 mg/dL     Fingerstick Glucose (POCT) [[id number]]  (Normal) Collected:  09/23/20 2304    Lab Status:  Final result Updated:  09/23/20 2305     POC Glucose 138 mg/dl     hCG, serum, qualitative [[id number]]  (Normal) Collected:  09/23/20 1806    Lab Status:  Final result Specimen:  Blood from Line Updated:  09/23/20 2236     Preg, Serum Negative    Fingerstick Glucose (POCT) [[id number]]  (Normal) Collected:  09/23/20 2202    Lab Status:  Final result Updated:  09/23/20 2203     POC Glucose 128 mg/dl     Fingerstick Glucose (POCT) [[id number]]  (Normal) Collected:  09/23/20 2109    Lab Status:  Final result Updated:  09/23/20 2111     POC Glucose 124 mg/dl     Basic metabolic panel [[id number]]  (Abnormal) Collected:  09/23/20 2025    Lab Status:  Final result Specimen:  Blood from Arm, Left Updated:  09/23/20 2048     Sodium 141 mmol/L      Potassium 3 5 mmol/L      Chloride 111 mmol/L      CO2 19 mmol/L      ANION GAP 11 mmol/L      BUN 14 mg/dL      Creatinine 0 60 mg/dL      Glucose 114 mg/dL      Calcium 6 9 mg/dL      eGFR 120 ml/min/1 73sq m     Narrative:       [place] guidelines for Chronic Kidney Disease (CKD):     Stage 1 with normal or high GFR (GFR > 90 mL/min/1 73 square meters)    Stage 2 Mild CKD (GFR = 60-89 mL/min/1 73 square meters)    Stage 3A Moderate CKD (GFR = 45-59 mL/min/1 73 square meters)    Stage 3B Moderate CKD (GFR = 30-44 mL/min/1 73 square meters)    Stage 4 Severe CKD (GFR = 15-29 mL/min/1 73 square meters)    Stage 5 End Stage CKD (GFR <15 mL/min/1 73 square meters)  Note: GFR calculation is accurate only with a steady state creatinine    Magnesium [[id number]]  (Abnormal) Collected:  09/23/20 2025    Lab Status:  Final result Specimen:  Blood from Arm, Left Updated:  09/23/20 2048     Magnesium 1 4 mg/dL     Phosphorus [[id number]]  (Abnormal) Collected:  09/23/20 2025    Lab Status:  Final result Specimen:  Blood from Arm, Left Updated:  09/23/20 2048     Phosphorus 1 9 mg/dL     Basic metabolic panel [[id number]]  (Abnormal) Collected:  09/23/20 1806    Lab Status:  Final result Specimen:  Blood from Line Updated:  09/23/20 1836     Sodium 139 mmol/L      Potassium 4 1 mmol/L      Chloride 106 mmol/L      CO2 17 mmol/L      ANION GAP 16 mmol/L      BUN 19 mg/dL      Creatinine 0 70 mg/dL      Glucose 170 mg/dL      Calcium 8 0 mg/dL      eGFR 114 ml/min/1 73sq m     Narrative:       [place] guidelines for Chronic Kidney Disease (CKD):     Stage 1 with normal or high GFR (GFR > 90 mL/min/1 73 square meters)    Stage 2 Mild CKD (GFR = 60-89 mL/min/1 73 square meters)    Stage 3A Moderate CKD (GFR = 45-59 mL/min/1 73 square meters)    Stage 3B Moderate CKD (GFR = 30-44 mL/min/1 73 square meters)    Stage 4 Severe CKD (GFR = 15-29 mL/min/1 73 square meters)    Stage 5 End Stage CKD (GFR <15 mL/min/1 73 square meters)  Note: GFR calculation is accurate only with a steady state creatinine    D-Dimer [[id number]]  (Normal) Collected:  09/23/20 1731    Lab Status:  Final result Specimen:  Blood from Arm, Right Updated:  09/23/20 1753     D-Dimer, Quant <0 27 ug/ml FEU     Fingerstick Glucose (POCT) [[id number]]  (Abnormal) Collected:  09/23/20 1739    Lab Status:  Final result Updated:  09/23/20 1740     POC Glucose 187 mg/dl     Urine Microscopic [[id number]]  (Normal) Collected:  09/23/20 1620    Lab Status:  Final result Specimen:  Urine, Clean Catch Updated:  09/23/20 1707     RBC, UA 0-1 /hpf      WBC, UA 2-4 /hpf      Epithelial Cells Occasional /hpf      Bacteria, UA Occasional /hpf     Comprehensive metabolic panel [[id number]]  (Abnormal) Collected:  09/23/20 1624    Lab Status:  Final result Specimen:  Blood from Arm, Right Updated:  09/23/20 1706     Sodium 136 mmol/L      Potassium 4 1 mmol/L      Chloride 99 mmol/L      CO2 15 mmol/L      ANION GAP 22 mmol/L      BUN 20 mg/dL      Creatinine 1 09 mg/dL      Glucose 223 mg/dL      Calcium 9 6 mg/dL      AST 19 U/L      ALT 28 U/L      Alkaline Phosphatase 113 U/L      Total Protein 9 4 g/dL      Albumin 4 9 g/dL      Total Bilirubin 0 49 mg/dL      eGFR 67 ml/min/1 73sq m     Narrative:       [place] guidelines for Chronic Kidney Disease (CKD):     Stage 1 with normal or high GFR (GFR > 90 mL/min/1 73 square meters)    Stage 2 Mild CKD (GFR = 60-89 mL/min/1 73 square meters)    Stage 3A Moderate CKD (GFR = 45-59 mL/min/1 73 square meters)    Stage 3B Moderate CKD (GFR = 30-44 mL/min/1 73 square meters)    Stage 4 Severe CKD (GFR = 15-29 mL/min/1 73 square meters)    Stage 5 End Stage CKD (GFR <15 mL/min/1 73 square meters)  Note: GFR calculation is accurate only with a steady state creatinine    Lipase [[id number]]  (Abnormal) Collected:  09/23/20 1624    Lab Status:  Final result Specimen:  Blood from Arm, Right Updated:  09/23/20 1706     Lipase 529 u/L     Magnesium [[id number]]  (Normal) Collected:  09/23/20 1624    Lab Status:  Final result Specimen:  Blood from Arm, Right Updated:  09/23/20 1706     Magnesium 1 8 mg/dL     Beta Hydroxybutyrate [[id number]]  (Abnormal) Collected:  09/23/20 1624    Lab Status:  Final result Specimen:  Blood from Arm, Right Updated:  09/23/20 1646     BETA-HYDROXYBUTYRATE 3 9 mmol/L     CBC and differential [[id number]]  (Abnormal) Collected:  09/23/20 1624    Lab Status:  Final result Specimen:  Blood from Arm, Right Updated:  09/23/20 1639     WBC 12 35 Thousand/uL      RBC 5 92 Million/uL      Hemoglobin 16 1 g/dL      Hematocrit 49 2 %      MCV 83 fL      MCH 27 2 pg      MCHC 32 7 g/dL      RDW 12 5 %      MPV 9 9 fL      Platelets 477 Thousands/uL      nRBC 0 /100 WBCs      Neutrophils Relative 89 %      Immat GRANS % 1 %      Lymphocytes Relative 8 %      Monocytes Relative 2 %      Eosinophils Relative 0 %      Basophils Relative 0 %      Neutrophils Absolute 10 99 Thousands/µL      Immature Grans Absolute 0 06 Thousand/uL      Lymphocytes Absolute 1 01 Thousands/µL      Monocytes Absolute 0 26 Thousand/µL      Eosinophils Absolute 0 00 Thousand/µL      Basophils Absolute 0 03 Thousands/µL     Blood gas, venous [[id number]]  (Abnormal) Collected:  09/23/20 1624    Lab Status:  Final result Specimen:  Blood from Arm, Right Updated:  09/23/20 1635     pH, Jorge 7 241     pCO2, Jorge 31 2 mm Hg      pO2, Jorge 50 3 mm Hg      HCO3, Jorge 13 1 mmol/L      Base Excess, Jorge -12 8 mmol/L      O2 Content, Jorge 19 0 ml/dL      O2 HGB, VENOUS 82 2 %     POCT pregnancy, urine [[id number]]  (Normal) Resulted:  09/23/20 1633    Lab Status:  Final result Updated:  09/23/20 1633     EXT PREG TEST UR (Ref: Negative) negative     Control valid    UA (URINE) with reflex to Scope [[id number]]  (Abnormal) Collected:  09/23/20 1620    Lab Status:  Final result Specimen:  Urine, Clean Catch Updated:  09/23/20 1629     Color, UA Yellow     Clarity, UA Clear     Specific Gravity, UA >=1 030     pH, UA 5 5     Leukocytes, UA Negative     Nitrite, UA Negative     Protein, UA 30 (1+) mg/dl      Glucose,  (1/4%) mg/dl      Ketones, UA 40 (2+) mg/dl      Urobilinogen, UA 0 2 E U /dl      Bilirubin, UA Moderate     Blood, UA Small                 XR chest 2 views   Final Result by Maco Rdz MD (09/23 1641)      No acute cardiopulmonary disease  Workstation performed: [id number]                    Procedures  Procedures         ED Course  ED Course as of Sep 24 1521   Wed Sep 23, 2020   1714 Pt noted to be in DKA  With pH 7 2, bicarb 13 1, gap of 22  Pt continuing on first liter of fluids here in the ER        1715 Spoke with [facility] AP in order to determine if pt should be made step down 2 or step down 1          1836 Case discussed with Dr Griselda Freud with IM and admission was agreed upon to Dr Catrachita Griffin service  MDM  Number of Diagnoses or Management Options  DKA (diabetic ketoacidoses) ([facility] [address] ): new and requires workup  Increased anion gap metabolic acidosis: new and requires workup  Diagnosis management comments: Pt is a 38 yo female with a PMH significant for type 1 DM on an insulin pump who presented to the ER with complaints of nausea and vomiting x 1 day  Symptoms began as exhaustion and fatigue yesterday and patient noted her blood sugars to be elevated to the 400s  This morning patient woke up with nausea and vomiting and her blood sugars remained elevated  Called PCP and was sent to ER for further evaluation  Patient also complaining of some shortness of breath      Patient noted to be tachycardic and in mild distress on exam   see exam as documented above     Given history and physical exam, concern for diabetic ketoacidosis  With complaints of shortness of breath, likely a compensatory mechanism for the patient to blow off off CO2 given her likely acidosis, however with her tachycardia cannot definitively rule out PE as patient does not PERC out  Ordered labs consistent of a CBC, CMP, Mg, lipase, beta hydroxybutyrate, VBG, UA, and urine preg  Ordered a chest x-ray and D-dimer given shortness of breath  Pt started on a liter fluid bolus and given a dose of zofran  Pt noted to be in DKA with a presenting blood sugar of 233, pH of 7 2, bicarb 13 1, and an anion gap of 22  Pt placed on the cardiac monitor and closely monitored in the ER  Pt remained stable and comfortable  PT completed 2 liters of fluid here in the ER  Repeat BMP showed gap to have decreased to 19  I spoke with the admitting team with [facility] and admission was agreed upon to Dr Caitlyn Gold service  Pt agreeable to the plan for admission  Bridging orders were placed          Amount and/or Complexity of Data Reviewed  Clinical lab tests: ordered and reviewed  Tests in the radiology section of CPT®: ordered and reviewed  Tests in the medicine section of CPT®: ordered and reviewed  Review and summarize past medical records: yes  Discuss the patient with other providers: yes  Independent visualization of images, tracings, or specimens: yes    Risk of Complications, Morbidity, and/or Mortality  Presenting problems: moderate  Diagnostic procedures: moderate  Management options: moderate    Patient Progress  Patient progress: stable      Disposition  Final diagnoses:   DKA (diabetic ketoacidoses) ([place] [address] )   Increased anion gap metabolic acidosis     Time reflects when diagnosis was documented in both MDM as applicable and the Disposition within this note     Time User Action Codes Description Comment    9/23/2020  6:37 PM Gianluca Rodriguez Add [E11 10] DKA (diabetic ketoacidoses) ([facility] )     9/23/2020  6:38 PM Stephanie Carlos [E87 2] Increased anion gap metabolic acidosis       ED Disposition     ED Disposition Condition Date/Time Comment    Admit Stable Wed Sep 23, 2020  6:39 PM Case was discussed with Dr Dai Álvarez and the patient's admission status was agreed to be Admission Status: inpatient status to the service of Dr Es [place]  Follow-up Information    None         Current Discharge Medication List      CONTINUE these medications which have NOT CHANGED    Details   Biotin 1 MG CAPS Take by mouth      insulin aspart (NovoLOG) 100 units/mL injection Inject under the skin continuous  Via pump      insulin lispro (Admelog) 100 units/mL injection INJECT 60 UNITS VIA INSULIN PUMP DAILY      multivitamin (THERAGRAN) TABS Take 1 tablet by mouth daily           No discharge procedures on file      PDMP Review     None          ED Provider  Electronically Signed by           Evans Woods PA-C  09/24/20 2063

## 2020-09-24 LAB
ANION GAP SERPL CALCULATED.3IONS-SCNC: 12 MMOL/L (ref 4–13)
ANION GAP SERPL CALCULATED.3IONS-SCNC: 7 MMOL/L (ref 4–13)
BUN SERPL-MCNC: 10 MG/DL (ref 5–25)
BUN SERPL-MCNC: 6 MG/DL (ref 5–25)
CALCIUM SERPL-MCNC: 6 MG/DL (ref 8.3–10.1)
CALCIUM SERPL-MCNC: 6.5 MG/DL (ref 8.3–10.1)
CHLORIDE SERPL-SCNC: 110 MMOL/L (ref 100–108)
CHLORIDE SERPL-SCNC: 111 MMOL/L (ref 100–108)
CO2 SERPL-SCNC: 19 MMOL/L (ref 21–32)
CO2 SERPL-SCNC: 21 MMOL/L (ref 21–32)
CREAT SERPL-MCNC: 0.52 MG/DL (ref 0.6–1.3)
CREAT SERPL-MCNC: 0.68 MG/DL (ref 0.6–1.3)
ERYTHROCYTE [DISTWIDTH] IN BLOOD BY AUTOMATED COUNT: 12.4 % (ref 11.6–15.1)
GFR SERPL CREATININE-BSD FRML MDRD: 115 ML/MIN/1.73SQ M
GFR SERPL CREATININE-BSD FRML MDRD: 126 ML/MIN/1.73SQ M
GLUCOSE SERPL-MCNC: 118 MG/DL (ref 65–140)
GLUCOSE SERPL-MCNC: 134 MG/DL (ref 65–140)
GLUCOSE SERPL-MCNC: 150 MG/DL (ref 65–140)
GLUCOSE SERPL-MCNC: 158 MG/DL (ref 65–140)
GLUCOSE SERPL-MCNC: 198 MG/DL (ref 65–140)
GLUCOSE SERPL-MCNC: 203 MG/DL (ref 65–140)
GLUCOSE SERPL-MCNC: 210 MG/DL (ref 65–140)
GLUCOSE SERPL-MCNC: 212 MG/DL (ref 65–140)
GLUCOSE SERPL-MCNC: 229 MG/DL (ref 65–140)
GLUCOSE SERPL-MCNC: 51 MG/DL (ref 65–140)
GLUCOSE SERPL-MCNC: 53 MG/DL (ref 65–140)
GLUCOSE SERPL-MCNC: 88 MG/DL (ref 65–140)
HCT VFR BLD AUTO: 33.4 % (ref 34.8–46.1)
HGB BLD-MCNC: 11.1 G/DL (ref 11.5–15.4)
LIPASE SERPL-CCNC: 176 U/L (ref 73–393)
MAGNESIUM SERPL-MCNC: 2.7 MG/DL (ref 1.6–2.6)
MAGNESIUM SERPL-MCNC: 3.3 MG/DL (ref 1.6–2.6)
MCH RBC QN AUTO: 27.7 PG (ref 26.8–34.3)
MCHC RBC AUTO-ENTMCNC: 33.2 G/DL (ref 31.4–37.4)
MCV RBC AUTO: 83 FL (ref 82–98)
PHOSPHATE SERPL-MCNC: 1.7 MG/DL (ref 2.7–4.5)
PHOSPHATE SERPL-MCNC: 2.4 MG/DL (ref 2.7–4.5)
PLATELET # BLD AUTO: 266 THOUSANDS/UL (ref 149–390)
PMV BLD AUTO: 9.7 FL (ref 8.9–12.7)
POTASSIUM SERPL-SCNC: 2.9 MMOL/L (ref 3.5–5.3)
POTASSIUM SERPL-SCNC: 3.8 MMOL/L (ref 3.5–5.3)
RBC # BLD AUTO: 4.01 MILLION/UL (ref 3.81–5.12)
SODIUM SERPL-SCNC: 139 MMOL/L (ref 136–145)
SODIUM SERPL-SCNC: 141 MMOL/L (ref 136–145)
WBC # BLD AUTO: 7.18 THOUSAND/UL (ref 4.31–10.16)

## 2020-09-24 PROCEDURE — 83735 ASSAY OF MAGNESIUM: CPT | Performed by: INTERNAL MEDICINE

## 2020-09-24 PROCEDURE — 82948 REAGENT STRIP/BLOOD GLUCOSE: CPT

## 2020-09-24 PROCEDURE — 99232 SBSQ HOSP IP/OBS MODERATE 35: CPT | Performed by: INTERNAL MEDICINE

## 2020-09-24 PROCEDURE — 84100 ASSAY OF PHOSPHORUS: CPT | Performed by: INTERNAL MEDICINE

## 2020-09-24 PROCEDURE — 99255 IP/OBS CONSLTJ NEW/EST HI 80: CPT | Performed by: INTERNAL MEDICINE

## 2020-09-24 PROCEDURE — 80048 BASIC METABOLIC PNL TOTAL CA: CPT | Performed by: INTERNAL MEDICINE

## 2020-09-24 PROCEDURE — 85027 COMPLETE CBC AUTOMATED: CPT | Performed by: NURSE PRACTITIONER

## 2020-09-24 PROCEDURE — 83970 ASSAY OF PARATHORMONE: CPT | Performed by: INTERNAL MEDICINE

## 2020-09-24 PROCEDURE — 83690 ASSAY OF LIPASE: CPT | Performed by: NURSE PRACTITIONER

## 2020-09-24 RX ORDER — DEXTROSE MONOHYDRATE 25 G/50ML
25 INJECTION, SOLUTION INTRAVENOUS ONCE
Status: COMPLETED | OUTPATIENT
Start: 2020-09-24 | End: 2020-09-24

## 2020-09-24 RX ORDER — B-COMPLEX WITH VITAMIN C
1 TABLET ORAL
Status: DISCONTINUED | OUTPATIENT
Start: 2020-09-24 | End: 2020-09-25 | Stop reason: HOSPADM

## 2020-09-24 RX ORDER — ACETAMINOPHEN 325 MG/1
650 TABLET ORAL EVERY 4 HOURS PRN
Status: DISCONTINUED | OUTPATIENT
Start: 2020-09-24 | End: 2020-09-25 | Stop reason: HOSPADM

## 2020-09-24 RX ORDER — POTASSIUM CHLORIDE 14.9 MG/ML
20 INJECTION INTRAVENOUS
Status: COMPLETED | OUTPATIENT
Start: 2020-09-24 | End: 2020-09-24

## 2020-09-24 RX ADMIN — POTASSIUM CHLORIDE 20 MEQ: 14.9 INJECTION, SOLUTION INTRAVENOUS at 03:55

## 2020-09-24 RX ADMIN — POTASSIUM CHLORIDE 20 MEQ: 14.9 INJECTION, SOLUTION INTRAVENOUS at 01:29

## 2020-09-24 RX ADMIN — OYSTER SHELL CALCIUM WITH VITAMIN D 1 TABLET: 500; 200 TABLET, FILM COATED ORAL at 18:02

## 2020-09-24 RX ADMIN — POTASSIUM CHLORIDE 20 MEQ: 14.9 INJECTION, SOLUTION INTRAVENOUS at 02:39

## 2020-09-24 RX ADMIN — ONDANSETRON 4 MG: 2 INJECTION INTRAMUSCULAR; INTRAVENOUS at 15:53

## 2020-09-24 RX ADMIN — POTASSIUM & SODIUM PHOSPHATES POWDER PACK 280-160-250 MG 1 PACKET: 280-160-250 PACK at 19:03

## 2020-09-24 RX ADMIN — ACETAMINOPHEN 650 MG: 325 TABLET, FILM COATED ORAL at 13:46

## 2020-09-24 RX ADMIN — SODIUM PHOSPHATE, MONOBASIC, MONOHYDRATE 21 MMOL: 276; 142 INJECTION, SOLUTION INTRAVENOUS at 01:45

## 2020-09-24 RX ADMIN — Medication 1 TABLET: at 08:40

## 2020-09-24 RX ADMIN — ONDANSETRON 4 MG: 2 INJECTION INTRAMUSCULAR; INTRAVENOUS at 10:00

## 2020-09-24 RX ADMIN — Medication 1 UNITS/HR: at 10:14

## 2020-09-24 RX ADMIN — DEXTROSE MONOHYDRATE 25 ML: 25 INJECTION, SOLUTION INTRAVENOUS at 06:56

## 2020-09-24 RX ADMIN — Medication 0.5 UNITS/HR: at 04:30

## 2020-09-24 NOTE — UTILIZATION REVIEW
Initial Clinical Review    Admission: Date/Time/Statement:   Admission Orders (From admission, onward)     Ordered        09/23/20 1838  Inpatient Admission  Once                   Orders Placed This Encounter   Procedures    Inpatient Admission     Standing Status:   Standing     Number of Occurrences:   1     Order Specific Question:   Admitting Physician     Answer:   Erika Santos [[id number]]     Order Specific Question:   Level of Care     Answer:   Level 2 Stepdown / HOT [14]     Order Specific Question:   Estimated length of stay     Answer:   More than 2 Midnights     Order Specific Question:   Certification     Answer:   I certify that inpatient services are medically necessary for this patient for a duration of greater than two midnights  See H&P and MD Progress Notes for additional information about the patient's course of treatment  ED Arrival Information     Expected Arrival Acuity Means of Arrival Escorted By Service Admission Type    - 9/23/2020 15:02 Urgent Walk-In Family Member General Medicine Urgent    Arrival Complaint    Vomiting        Chief Complaint   Patient presents with    Vomiting     PT reports "not feeling well since yesterday and sugars all over the place (400's) and today started vomiting"     Assessment/Plan: 38 yo female with PMH DM type 1 on Insulin pump presents to ED with N/V x 1 day and diarrhea x 1, generalized abd pain; started yesterday with fatigue  BS's running high into the 400's- normally well controlled  On arrival notes some SOB, nausea  Afebrile,Tachycardic, abd soft  WBC's 12 35, Hg 16 1, Plats 416, Glu 223, Lipase 529, beta-hydroxybutrate 3 9, agap 22,  ph -7 241, pco2 - 31 2, HCO3 - 13 1  Urine: glu 250, 1=protein, 2+ketones, mod bili  Admitted to Inpatient Level 2 Stepdown with DKA, Elevated Lipase  Plan: Insulin drip, monitor elytes closely & replete, Consult Endocrine, IVF's, clear liquids, monitor lipase (likely elevated 2/2 N/V)    9/24    Continues on Insulin drip and IVF's    Downgraded to Rolo Barnesyaneli 5    ED Triage Vitals [09/23/20 1517]   Temperature Pulse Respirations Blood Pressure SpO2   97 7 °F (36 5 °C) (!) 127 18 119/77 99 %      Temp Source Heart Rate Source Patient Position - Orthostatic VS BP Location FiO2 (%)   Oral Monitor Sitting Left arm --      Pain Score       3          Wt Readings from Last 1 Encounters:   09/23/20 56 5 kg (124 lb 9 6 oz)     Additional Vital Signs:   09/24/20 09:40:37   98 1 °F (36 7 °C)   84   18   120/80  98 %   None (Room air)  --    09/24/20 0800   98 1 °F (36 7 °C)   85   19   100/63  100 %   --  --    09/24/20 0300   97 6 °F (36 4 °C)   80   19   98/54  100 %   --  --    09/24/20 0022   --   85   --   --  100 %   --  --    09/23/20 2334   97 9 °F (36 6 °C)   90   18   102/57  100 %   None (Room air)  Lying    09/23/20 2152   --   86   18   108/67  100 %   --  --    09/23/20 2100   --   88   18   --  100 %   --  --    09/23/20 1930   --   88   18   110/74  100 %   --  --    09/23/20 1900   --   86   18   --  100 %   --  --    09/23/20 1735   --   99   18   112/77  100 %   None (Room air)  Sitting      Pertinent Labs/Diagnostic Test Results:   Results from last 7 days   Lab Units 09/24/20  0353 09/23/20  1624   WBC Thousand/uL 7 18 12 35*   HEMOGLOBIN g/dL 11 1* 16 1*   HEMATOCRIT % 33 4* 49 2*   PLATELETS Thousands/uL 266 416*   NEUTROS ABS Thousands/µL  --  10 99*     Results from last 7 days   Lab Units 09/24/20  0353 09/23/20  2351 09/23/20 2025 09/23/20  1806 09/23/20  1624   SODIUM mmol/L 139 141 141 139 136   POTASSIUM mmol/L 3 8 2 9* 3 5 4 1 4 1   CHLORIDE mmol/L 111* 110* 111* 106 99*   CO2 mmol/L 21 19* 19* 17* 15*   ANION GAP mmol/L 7 12 11 16* 22*   BUN mg/dL 6 10 14 19 20   CREATININE mg/dL 0 52* 0 68 0 60 0 70 1 09   EGFR ml/min/1 73sq m 126 115 120 114 67   CALCIUM mg/dL 6 0* 6 5* 6 9* 8 0* 9 6   MAGNESIUM mg/dL 2 7* 3 3* 1 4*  --  1 8   PHOSPHORUS mg/dL 2 4* 1 7* 1 9*  --   --      Results from last 7 days   Lab Units 09/23/20  1624   AST U/L 19   ALT U/L 28   ALK PHOS U/L 113   TOTAL PROTEIN g/dL 9 4*   ALBUMIN g/dL 4 9   TOTAL BILIRUBIN mg/dL 0 49     Results from last 7 days   Lab Units 09/24/20  0802 09/24/20  0731 09/24/20  0621 09/24/20  0553 09/24/20  0211 09/24/20  0001 09/23/20  2304 09/23/20  2202 09/23/20  2109 09/23/20  1739   POC GLUCOSE mg/dl 212* 210* 53* 51* 134 118 138 128 124 187*     Results from last 7 days   Lab Units 09/24/20  0353 09/23/20  2351 09/23/20  2025 09/23/20  1806 09/23/20  1624   GLUCOSE RANDOM mg/dL 88 150* 114 170* 223*     BETA-HYDROXYBUTYRATE   Date Value Ref Range Status   09/23/2020 3 9 (H) <0 6 mmol/L Final      Results from last 7 days   Lab Units 09/23/20  1624   PH JUSTIN  7 241*   PCO2 JUSTIN mm Hg 31 2*   PO2 JUSTIN mm Hg 50 3*   HCO3 JUSTIN mmol/L 13 1*   BASE EXC JUSTIN mmol/L -12 8   O2 CONTENT JUSTIN ml/dL 19 0   O2 HGB, VENOUS % 82 2*     Results from last 7 days   Lab Units 09/23/20  1731   D-DIMER QUANTITATIVE ug/ml FEU <0 27     Results from last 7 days   Lab Units 09/24/20  0353 09/23/20  1624   LIPASE u/L 176 529*     Results from last 7 days   Lab Units 09/23/20  1620   CLARITY UA  Clear   COLOR UA  Yellow   SPEC GRAV UA  >=1 030   PH UA  5 5   GLUCOSE UA mg/dl 250 (1/4%)*   KETONES UA mg/dl 40 (2+)*   BLOOD UA  Small*   PROTEIN UA mg/dl 30 (1+)*   NITRITE UA  Negative   BILIRUBIN UA  Moderate*   UROBILINOGEN UA E U /dl 0 2   LEUKOCYTES UA  Negative   WBC UA /hpf 2-4   RBC UA /hpf 0-1   BACTERIA UA /hpf Occasional   EPITHELIAL CELLS WET PREP /hpf Occasional     9/23 CXR: No acute cardiopulmonary disease     ED Treatment:   Medication Administration from 09/23/2020 1502 to 09/23/2020 2323       Date/Time Order Dose Route Action     09/23/2020 1625 sodium chloride 0 9 % bolus 1,000 mL 1,000 mL Intravenous New Bag     09/23/2020 1627 ondansetron (ZOFRAN) injection 4 mg 4 mg Intravenous Given     09/23/2020 1757 sodium chloride 0 9 % bolus 1,000 mL 1,000 mL Intravenous New Bag 09/23/2020 1955 insulin regular (HumuLIN R,NovoLIN R) 1 Units/mL in sodium chloride 0 9 % 100 mL infusion 5 7 Units/hr Intravenous New Bag     09/23/2020 1923 sodium chloride 0 9 % infusion 2,000 mL/hr Intravenous New Bag     09/23/2020 2207 sodium chloride 0 9 % infusion 500 mL/hr Intravenous New Bag     09/23/2020 2123 sodium chloride 0 9 % infusion 500 mL/hr Intravenous New Bag     09/23/2020 1957 dextrose 5 % and sodium chloride 0 9 % infusion 250 mL/hr Intravenous New Bag     09/23/2020 2125 potassium chloride (K-DUR,KLOR-CON) CR tablet 40 mEq 40 mEq Oral Given     09/23/2020 2238 magnesium sulfate 4 g/100 mL IVPB (premix) 4 g 4 g Intravenous New Bag     09/23/2020 2140 magnesium sulfate 2 g/50 mL IVPB (premix) 2 g 2 g Intravenous New Bag     09/23/2020 2146 sodium phosphate 21 mmol in dextrose 5 % 250 mL Infusion 21 mmol Intravenous New Bag     09/23/2020 2236 ondansetron (ZOFRAN) injection 4 mg 4 mg Intravenous Given        Past Medical History:   Diagnosis Date    Diabetes mellitus ([facility] )      Present on Admission:   Diabetic ketoacidosis without coma associated with type 1 diabetes mellitus ([facility] )   Elevated lipase      Admitting Diagnosis: DKA (diabetic ketoacidoses) ([facility] ) [E11 10]  Vomiting [R11 10]  Increased anion gap metabolic acidosis [D99 1]     Age/Sex: 37 y o  female  Admission Orders:  Scheduled Medications:  multivitamin-minerals, 1 tablet, Oral, Daily  potassium chloride 20 mEq IVPB (premix)  X 3 doses  9/24  sodium phosphate 21 mmol in dextrose 5 % 250 mL Infusion   9/24    Continuous IV Infusions:  dextrose 5 % and sodium chloride 0 9 %, 100 mL/hr, Intravenous, Continuous  insulin regular (HumuLIN R,NovoLIN R) infusion, 0 3-21 Units/hr, Intravenous, Titrated    PRN Meds:  ondansetron, 4 mg, Intravenous, Q4H PRN    IP CONSULT TO ENDOCRINOLOGY    Network Utilization Review Department  [email or web address]  ATTENTION: Please call with any questions or concerns to [phone] and carefully listen to the prompts so that you are directed to the right person  All voicemails are confidential   Vandana Landry all requests for admission clinical reviews, approved or denied determinations and any other requests to dedicated fax number below belonging to the campus where the patient is receiving treatment   List of dedicated fax numbers for the Facilities:  [address] DENIALS (Administrative/Medical Necessity) [phone]   [address] (Maternity/NICU/Pediatrics) [phone]   [facility] Nesha [phone]     [address]  [phone]   Debra Pickering [phone]   Irma Ibarra [phone]   12[address] [facility] [address] [place] [address] [phone]   [facility]  [phone]   [address] [facility] [address] [facility]  [address] [facility] 1000 [address] [facility] [phone]

## 2020-09-24 NOTE — RESPIRATORY THERAPY NOTE
RT Protocol Note  Eneida Bowman 37 y o  female MRN: [id number]  Unit/Bed#: ICU 02 Encounter: [id number]    Assessment    Principal Problem:    Diabetic ketoacidosis without coma associated with type 1 diabetes mellitus [facility])  Active Problems:    Elevated lipase      Home Pulmonary Medications: None     09/24/20 0022   Respiratory Protocol   Protocol Initiated? Yes   Protocol Selection Respiratory   Language Barrier? No   Medical & Social History Reviewed? Yes   Diagnostic Studies Reviewed? Yes   Physical Assessment Performed? Yes   Respiratory Plan No distress/Pulmonary history   Respiratory Assessment   Assessment Type Assess only   General Appearance Awake; Alert   Respiratory Pattern Normal   Chest Assessment Chest expansion symmetrical   Bilateral Breath Sounds Clear   Resp Comments Assessment preformed as per protocol  Pt has no prior pulmonary history and no prior pulmonary meds  Pt on room air SpO2 100%  Will D/C respiratory protocol order  Additional Assessments   Pulse 85   SpO2 100 %        09/24/20 0022   Respiratory Protocol   Protocol Initiated? Yes   Protocol Selection Respiratory   Language Barrier? No   Medical & Social History Reviewed? Yes   Diagnostic Studies Reviewed? Yes   Physical Assessment Performed? Yes   Respiratory Plan No distress/Pulmonary history   Respiratory Assessment   Assessment Type Assess only   General Appearance Awake; Alert   Respiratory Pattern Normal   Chest Assessment Chest expansion symmetrical   Bilateral Breath Sounds Clear   Resp Comments Assessment preformed as per protocol  Pt has no prior pulmonary history and no prior pulmonary meds  Pt on room air SpO2 100%  Will D/C respiratory protocol order      Additional Assessments   Pulse 85   SpO2 100 %          Past Medical History:   Diagnosis Date    Diabetes mellitus ([address] )      Social History     Socioeconomic History    Marital status: Single     Spouse name: None    Number of children: None    Years of education: None    Highest education level: None   Occupational History    None   Social Needs    Financial resource strain: None    Food insecurity     Worry: None     Inability: None    Transportation needs     Medical: None     Non-medical: None   Tobacco Use    Smoking status: Never Smoker    Smokeless tobacco: Never Used   Substance and Sexual Activity    Alcohol use: No    Drug use: No    Sexual activity: None   Lifestyle    Physical activity     Days per week: None     Minutes per session: None    Stress: None   Relationships    Social connections     Talks on phone: None     Gets together: None     Attends Yazidism service: None     Active member of club or organization: None     Attends meetings of clubs or organizations: None     Relationship status: None    Intimate partner violence     Fear of current or ex partner: None     Emotionally abused: None     Physically abused: None     Forced sexual activity: None   Other Topics Concern    None   Social History Narrative    None       Subjective         Objective    Physical Exam:   Assessment Type: Assess only  General Appearance: Awake, Alert  Respiratory Pattern: Normal  Chest Assessment: Chest expansion symmetrical  Bilateral Breath Sounds: Clear    Vitals:  Blood pressure 102/57, pulse 85, temperature 97 9 °F (36 6 °C), temperature source Oral, resp  rate 18, weight 56 5 kg (124 lb 9 6 oz), last menstrual period 09/01/2020, SpO2 100 %  Imaging and other studies: I have personally reviewed pertinent reports  Plan    Respiratory Plan: No distress/Pulmonary history        Resp Comments: Assessment preformed as per protocol  Pt has no prior pulmonary history and no prior pulmonary meds  Pt on room air SpO2 100%  Will D/C respiratory protocol order

## 2020-09-24 NOTE — ASSESSMENT & PLAN NOTE
Lab Results   Component Value Date    HGBA1C 8 9 (H) 03/11/2020     Recent Labs     09/24/20  0731 09/24/20  0802 09/24/20  1008 09/24/20  1202   POCGLU 210* 212* 158* 203*     Blood Sugar Average: Last 72 hrs:  · (P) 143   · Patient with history of type 1 diabetes on insulin pump presenting with a days history nausea, vomiting  Also reports uncontrolled blood glucose since onset  · Workup in the ED revealed elevated blood glucose in the 200s with positive beta hydroxybutyrate, elevated anion gap at 22  · She was started on insulin infusion DKA protocol with resolution of elevated anion gap and improved blood glucose control  Episode of hypoglycemia noted this morning  · Patient has now been transitioned from insulin infusion back to her insulin pump  · I discussed with Dr Raissa Quintero who will see her later today  · She follows with endocrinology at [facility]  · Continue to monitor blood glucose levels  Accu-Cheks changed to a c  HS

## 2020-09-24 NOTE — UTILIZATION REVIEW
Notification of Inpatient Admission/Inpatient Authorization Request   This is a Notification of Inpatient Admission for [address]  Be advised that this patient was admitted to our facility under Inpatient Status  Contact Arin Bae at [phone] for additional admission information  [facility]  DEDICATED [phone]  Patient Name:   Bentley Ray   YOB: 1985       [address]   [address]:   [place]  Tax ID: [id number]  NPI: [id number] Attending Provider/NPI:  Address:  Phone: Zander Cook, Juan Carlos Jarrell [[id number]]  Same as the facility  [phone]   Place of Service Code: 24 Place of Service Name:  [address] [place] [address]   Start Date: 9/23/20 5814     Discharge Date & Time: No discharge date for patient encounter  Type of Admission: Inpatient Status Discharge Disposition   (if discharged): Elopement/ER Elopement   Patient Diagnoses: DKA (diabetic ketoacidoses) ([facility] ) [E11 10]  Vomiting [R11 10]  Increased anion gap metabolic acidosis [Q46 9]     Orders: Admission Orders (From admission, onward)     Ordered        09/23/20 1838  Inpatient Admission  Once                    Assigned Utilization Review Contact: Arin Bae  Utilization   Network Utilization Review Department  Phone: [phone]; Fax [phone]  Email: [email or web address]   ATTENTION PAYERS: Please call the assigned Utilization  directly with any questions or concerns ALL voicemails in the department are confidential  Send all requests for admission clinical reviews, approved or denied determinations and any other requests to dedicated fax number belonging to the campus where the patient is receiving treatment

## 2020-09-24 NOTE — SOCIAL WORK
LOS 1 day  Patient is not a bundle or a readmission  Patient with DKA initally on insulin gtt no transitioned back to pump  D/C to home if BG remains stable and patient is able tolerate diet  No needs are identified at the time of the initial screening,  care manager will respond upon request or reassess patient for discharge needs as appropriate

## 2020-09-24 NOTE — PLAN OF CARE
Problem: PAIN - ADULT  Goal: Verbalizes/displays adequate comfort level or baseline comfort level  Description: Interventions:  - Encourage patient to monitor pain and request assistance  - Assess pain using appropriate pain scale  - Administer analgesics based on type and severity of pain and evaluate response  - Implement non-pharmacological measures as appropriate and evaluate response  - Consider cultural and social influences on pain and pain management  - Notify physician/advanced practitioner if interventions unsuccessful or patient reports new pain  Outcome: Progressing     Problem: INFECTION - ADULT  Goal: Absence or prevention of progression during hospitalization  Description: INTERVENTIONS:  - Assess and monitor for signs and symptoms of infection  - Monitor lab/diagnostic results  - Monitor all insertion sites, i e  indwelling lines, tubes, and drains  - Monitor endotracheal if appropriate and nasal secretions for changes in amount and color  - [place] appropriate cooling/warming therapies per order  - Administer medications as ordered  - Instruct and encourage patient and family to use good hand hygiene technique  - Identify and instruct in appropriate isolation precautions for identified infection/condition  Outcome: Progressing  Goal: Absence of fever/infection during neutropenic period  Description: INTERVENTIONS:  - Monitor WBC    Outcome: Progressing     Problem: SAFETY ADULT  Goal: Patient will remain free of falls  Description: INTERVENTIONS:  - Assess patient frequently for physical needs  -  Identify cognitive and physical deficits and behaviors that affect risk of falls    -  [place] fall precautions as indicated by assessment   - Educate patient/family on patient safety including physical limitations  - Instruct patient to call for assistance with activity based on assessment  - Modify environment to reduce risk of injury  - Consider OT/PT consult to assist with strengthening/mobility  Outcome: Progressing  Goal: Maintain or return to baseline ADL function  Description: INTERVENTIONS:  -  Assess patient's ability to carry out ADLs; assess patient's baseline for ADL function and identify physical deficits which impact ability to perform ADLs (bathing, care of mouth/teeth, toileting, grooming, dressing, etc )  - Assess/evaluate cause of self-care deficits   - Assess range of motion  - Assess patient's mobility; develop plan if impaired  - Assess patient's need for assistive devices and provide as appropriate  - Encourage maximum independence but intervene and supervise when necessary  - Involve family in performance of ADLs  - Assess for home care needs following discharge   - Consider OT consult to assist with ADL evaluation and planning for discharge  - Provide patient education as appropriate  Outcome: Progressing  Goal: Maintain or return mobility status to optimal level  Description: INTERVENTIONS:  - Assess patient's baseline mobility status (ambulation, transfers, stairs, etc )    - Identify cognitive and physical deficits and behaviors that affect mobility  - Identify mobility aids required to assist with transfers and/or ambulation (gait belt, sit-to-stand, lift, walker, cane, etc )  - [place] fall precautions as indicated by assessment  - Record patient progress and toleration of activity level on Mobility SBAR; progress patient to next Phase/Stage  - Instruct patient to call for assistance with activity based on assessment  - Consider rehabilitation consult to assist with strengthening/weightbearing, etc   Outcome: Progressing     Problem: DISCHARGE PLANNING  Goal: Discharge to home or other facility with appropriate resources  Description: INTERVENTIONS:  - Identify barriers to discharge w/patient and caregiver  - Arrange for needed discharge resources and transportation as appropriate  - Identify discharge learning needs (meds, wound care, etc )  - Arrange for interpretive services to assist at discharge as needed  - Refer to Case Management Department for coordinating discharge planning if the patient needs post-hospital services based on physician/advanced practitioner order or complex needs related to functional status, cognitive ability, or social support system  Outcome: Progressing     Problem: Knowledge Deficit  Goal: Patient/family/caregiver demonstrates understanding of disease process, treatment plan, medications, and discharge instructions  Description: Complete learning assessment and assess knowledge base    Interventions:  - Provide teaching at level of understanding  - Provide teaching via preferred learning methods  Outcome: Progressing

## 2020-09-24 NOTE — CONSULTS
Consultation - Ophelia Dave 37 y o  female MRN: [id number]    Unit/Bed#: W -01 Encounter: [id number]      Assessment/Plan     Assessment: This is a 37y o -year-old female with   1  Type 1 diabetes with hyperglycemia  2  Type 1 diabetes with diabetic ketoacidosis- resolved  3  Hypocalcemia    Plan:  1  Type 1 diabetes with hyperglycemia presented with diabetic ketoacidosis  As anion gap is closed, agree to continue insulin pump on current settings, as mentioned in HPI  Discussed with patient to use bolus wizard for each meal, and enter appropriate number of carbs as well as blood sugars to prevent fluctuation in blood sugars  Patient is comfortable using insulin pump and settings,  Continue to monitor blood sugar with meals and at bedtime, as well as at 3:00 a m  Will follow-up  -she will need a follow-up appointment with endocrinology on outpatient basis after discharge  -she follows with [facility] endocrinology    2  Hypocalcemia- most likely dilutional from IV hydration  Will order PTH and vitamin D level   Patient does not have any symptoms of hypocalcemia  Start calcium carbonate 500 mg 3 times daily  Follow-up calcium    3, hypophosphatemia- managed by Primary team     4  Nausea/vometting- improved       Zack Feliciano MD        CC: Diabetes Consult    History of Present Illness     HPI: Ophelia Dave is a 37y o  year old female with type 1 diabetes since age of 5, currently managed on [employer] 670 G insulin pump  She was admitted with chief complaints of nausea vomiting abdominal pain and diarrhea, was found to have elevated anion gap from low bicarbonate suggestive of diabetic ketoacidosis  She was started on insulin infusion as per DKA protocol, most recent blood work from 3:00 a m  In the morning with anion gap 7, insulin drip was stopped at 2:00 Pm, she was switched to insulin pump, around 12 afternoon    Her current insulin pump settings are  Basal rates  Twelve AM-0 6 units/hour  3:00 a  m -0 65 units/hour  8 a m  -0 8 units/hour  11:00 a m  0 9 units/hour  2:00 p m -0 9 units/hour  8:00 p m  - 0 65 units/hour    Insulin to carb ratio  12:00 a m - 15  4:00 a m -10  6:00 a m -8 5  11:00 a m  -7 0  6:00 p m -7 0  11:00 p m  -9 0    ISF  12:00 a m -55  3:00 a m -40  10:00 p m -55    Target  12:00 a m -  Insulin on board-3 hour  She denies any complications of diabetes, follows with [facility]ocr[facility]ology at [facility] on outpatient basis  She denies hypoglycemia unawareness  She just received new sensor and his going to start in few days  Inpatient consult to Endocrinology  Consult performed by: Sudarshan Yeboah MD  Consult ordered by: ELDON Torres          Review of Systems   Constitutional: Positive for activity change (being in hospital )  Negative for diaphoresis, fatigue, fever and unexpected weight change  HENT: Negative  Eyes: Negative for visual disturbance  Respiratory: Negative for cough, chest tightness and shortness of breath  Cardiovascular: Negative for chest pain, palpitations and leg swelling  Gastrointestinal: Negative for abdominal pain, blood in stool, constipation, diarrhea, nausea and vomiting  Endocrine: Negative for cold intolerance, heat intolerance, polydipsia, polyphagia and polyuria  Genitourinary: Negative for dysuria, enuresis, frequency and urgency  Musculoskeletal: Positive for myalgias  Negative for arthralgias  Denies muscle spasms or twitching   Skin: Negative for pallor, rash and wound  Allergic/Immunologic: Negative  Neurological: Negative for dizziness, tremors, weakness and numbness  Hematological: Negative  Psychiatric/Behavioral: Negative  Historical Information   Past Medical History:   Diagnosis Date    Diabetes mellitus ([facility] [address] )      History reviewed  No pertinent surgical history    Social History   Social History     Substance and Sexual Activity   Alcohol Use No     Social History Substance and Sexual Activity   Drug Use No     Social History     Tobacco Use   Smoking Status Never Smoker   Smokeless Tobacco Never Used     Family History:   Family History   Problem Relation Age of Onset    Lung disease Maternal Grandfather        Meds/Allergies   Current Facility-Administered Medications   Medication Dose Route Frequency Provider Last Rate Last Dose    acetaminophen (TYLENOL) tablet 650 mg  650 mg Oral Q4H PRN Agnes Owen MD   650 mg at 09/24/20 1346    dextrose 5 % and sodium chloride 0 9 % infusion  100 mL/hr Intravenous Continuous ELDON Díaz 100 mL/hr at 09/24/20 0628 100 mL/hr at 09/24/20 0628    insulin lispro (HumaLOG) FOR PUMP REFILLS 100 Units  1 mL Subcutaneous Insulin Pump Daily PRN Agnes Owen MD        insulin regular (HumuLIN R,NovoLIN R) 1 Units/mL in sodium chloride 0 9 % 100 mL infusion  0 3-21 Units/hr Intravenous Titrated ELDON Díaz   Stopped at 09/24/20 1314    multivitamin-minerals (CENTRUM) tablet 1 tablet  1 tablet Oral Daily ELDON Díaz   1 tablet at 09/24/20 0840    ondansetron (ZOFRAN) injection 4 mg  4 mg Intravenous Q4H PRN ELDON Díaz   4 mg at 09/24/20 1000     Allergies   Allergen Reactions    Augmentin [Amoxicillin-Pot Clavulanate] Rash       Objective   Vitals: Blood pressure 116/80, pulse 92, temperature 98 3 °F (36 8 °C), resp  rate 16, height 5' 1" (1 549 m), weight 56 5 kg (124 lb 9 6 oz), last menstrual period 09/01/2020, SpO2 96 %  Intake/Output Summary (Last 24 hours) at 9/24/2020 1551  Last data filed at 9/24/2020 8612  Gross per 24 hour   Intake 7702 37 ml   Output 1125 ml   Net 6577 37 ml     Invasive Devices     Peripheral Intravenous Line            Peripheral IV 09/23/20 Left Antecubital less than 1 day    Peripheral IV 09/23/20 Right Antecubital less than 1 day                Physical Exam  Vitals signs reviewed  Constitutional:       General: She is not in acute distress       Appearance: Normal appearance  She is well-developed  She is not diaphoretic  Comments: No Chvostek's sign  No trousseau's sign    HENT:      Head: Normocephalic and atraumatic  Eyes:      General:         Right eye: No discharge  Left eye: No discharge  Conjunctiva/sclera: Conjunctivae normal       Pupils: Pupils are equal, round, and reactive to light  Neck:      Musculoskeletal: Normal range of motion and neck supple  Thyroid: No thyromegaly  Trachea: No tracheal deviation  Cardiovascular:      Rate and Rhythm: Normal rate and regular rhythm  Pulses: Normal pulses  Heart sounds: Normal heart sounds  No murmur  No friction rub  Pulmonary:      Effort: Pulmonary effort is normal  No respiratory distress  Breath sounds: Normal breath sounds  No wheezing or rales  Chest:      Chest wall: No tenderness  Abdominal:      General: Abdomen is flat  Bowel sounds are normal       Palpations: Abdomen is soft  Musculoskeletal: Normal range of motion  General: No tenderness or deformity  Lymphadenopathy:      Cervical: No cervical adenopathy  Skin:     General: Skin is warm and dry  Coloration: Skin is not pale  Findings: No erythema or rash  Neurological:      General: No focal deficit present  Mental Status: She is alert and oriented to person, place, and time  Motor: No abnormal muscle tone  Deep Tendon Reflexes: Reflexes are normal and symmetric  Psychiatric:         Mood and Affect: Mood normal          Behavior: Behavior normal          The history was obtained from the review of the chart, patient      Lab Results:       Lab Results   Component Value Date    WBC 7 18 09/24/2020    HGB 11 1 (L) 09/24/2020    HCT 33 4 (L) 09/24/2020    MCV 83 09/24/2020     09/24/2020     Lab Results   Component Value Date/Time    BUN 6 09/24/2020 03:53 AM    K 3 8 09/24/2020 03:53 AM     (H) 09/24/2020 03:53 AM    CO2 21 09/24/2020 03:53 AM CREATININE 0 52 (L) 09/24/2020 03:53 AM    AST 19 09/23/2020 04:24 PM    ALT 28 09/23/2020 04:24 PM    ALB 4 9 09/23/2020 04:24 PM     No results for input(s): CHOL, HDL, LDL, TRIG, VLDL in the last 72 hours  No results found for: Anjana Baumann  POC Glucose (mg/dl)   Date Value   09/24/2020 203 (H)   09/24/2020 158 (H)   09/24/2020 212 (H)   09/24/2020 210 (H)   09/24/2020 53 (L)   09/24/2020 51 (L)   09/24/2020 134   09/24/2020 118   09/23/2020 138   09/23/2020 128       Imaging Studies: I have personally reviewed pertinent reports  Portions of the record may have been created with voice recognition software

## 2020-09-24 NOTE — PROGRESS NOTES
Progress Note - Jericabalaji Allen 1985, 37 y o  female MRN: [id number]    Unit/Bed#: W -01 Encounter: [id number]    Primary Care Provider: No primary care provider on file  Date and time admitted to hospital: 9/23/2020  3:54 PM      * Diabetic ketoacidosis without coma associated with type 1 diabetes mellitus [facility])  Assessment & Plan  Lab Results   Component Value Date    HGBA1C 8 9 (H) 03/11/2020     Recent Labs     09/24/20  0731 09/24/20  0802 09/24/20  1008 09/24/20  1202   POCGLU 210* 212* 158* 203*     Blood Sugar Average: Last 72 hrs:  · (P) 143   · Patient with history of type 1 diabetes on insulin pump presenting with a days history nausea, vomiting  Also reports uncontrolled blood glucose since onset  · Workup in the ED revealed elevated blood glucose in the 200s with positive beta hydroxybutyrate, elevated anion gap at 22  · She was started on insulin infusion DKA protocol with resolution of elevated anion gap and improved blood glucose control  Episode of hypoglycemia noted this morning  · Patient has now been transitioned from insulin infusion back to her insulin pump  · I discussed with Dr Ke Mazariegos who will see her later today  · She follows with endocrinology at [facility]  · Continue to monitor blood glucose levels  Accu-Cheks changed to a c  HS  Elevated lipase  Assessment & Plan  · POA, lipase elevated at 529 on admission  Likely secondary to nausea/vomiting  · No abdominal pain or tenderness  Does report continued nausea today but no vomiting  She was able to tolerate her breakfast but reports some queasiness  · Lipase this morning is improved to 176  · Continue p r n  Antiemetics and encourage increased oral intake      VTE Pharmacologic Prophylaxis:   Pharmacologic:  Low risk, ambulate    Patient Centered Rounds: I have performed bedside rounds with nursing staff today      Discussions with Specialists or Other Care Team Provider: Nursing/endocrinologist    Education and Discussions with Family / Patient:  Patient    Current Length of Stay: 1 day(s)    Current Patient Status: Inpatient   Certification Statement: The patient will continue to require additional inpatient hospital stay due to Above diagnosis and care plan    Discharge Plan:  Anticipate discharge home tomorrow if blood glucose remained stable and she continues to tolerate increased p o  Intake    Code Status: Level 1 - Full Code      Subjective:   Seen and evaluated  She reports nausea this morning  Tolerated breakfast but did not have much of lunch due to poor appetite  She he also reports queasiness in her stomach  No fever or chills, no diarrhea  Objective:     Vitals:   Temp (24hrs), Av 9 °F (36 6 °C), Min:97 6 °F (36 4 °C), Max:98 1 °F (36 7 °C)    Temp:  [97 6 °F (36 4 °C)-98 1 °F (36 7 °C)] 98 1 °F (36 7 °C)  HR:  [] 84  Resp:  [18-19] 18  BP: ()/(54-80) 120/80  SpO2:  [98 %-100 %] 98 %  Body mass index is 23 54 kg/m²  Input and Output Summary (last 24 hours):        Intake/Output Summary (Last 24 hours) at 2020 1449  Last data filed at 2020 1345  Gross per 24 hour   Intake 7702 37 ml   Output 1125 ml   Net 6577 37 ml       Physical Exam:  General Appearance:    Alert, cooperative, no distress, appropriately responsive    Head:    Normocephalic, without obvious abnormality, atraumatic, mucous membranes moist    Eyes:    Conjunctiva/corneas clear, EOM's intact   Neck:   Supple   Lungs:     Clear to auscultation bilaterally, respirations unlabored, no crackles or wheeze     Heart:    Regular rate and rhythm, S1 and S2    Abdomen:     Soft, non-tender, nondistended   Extremities:   Extremities normal, atraumatic, no cyanosis or edema   Neurologic:  nonfocal         Additional Data:     Labs:    Results from last 7 days   Lab Units 20  0353 20  1624   WBC Thousand/uL 7 18 12 35*   HEMOGLOBIN g/dL 11 1* 16 1*   HEMATOCRIT % 33 4* 49 2*   PLATELETS Thousands/uL 266 416*   NEUTROS PCT %  --  89*   LYMPHS PCT %  --  8*   MONOS PCT %  --  2*   EOS PCT %  --  0     Results from last 7 days   Lab Units 09/24/20  0353  09/23/20  1624   POTASSIUM mmol/L 3 8   < > 4 1   CHLORIDE mmol/L 111*   < > 99*   CO2 mmol/L 21   < > 15*   BUN mg/dL 6   < > 20   CREATININE mg/dL 0 52*   < > 1 09   CALCIUM mg/dL 6 0*   < > 9 6   ALK PHOS U/L  --   --  113   ALT U/L  --   --  28   AST U/L  --   --  19    < > = values in this interval not displayed  * I Have Reviewed All Lab Data Listed Above  * Additional Pertinent Lab Tests Reviewed: [facility] Admission Reviewed    Cultures:   Blood Culture: No results found for: BLOODCX  Urine Culture: No results found for: URINECX  Sputum Culture: No components found for: SPUTUMCX  Wound Culture: No results found for: WOUNDCULT    Last 24 Hours Medication List:   Current Facility-Administered Medications   Medication Dose Route Frequency Provider Last Rate    acetaminophen  650 mg Oral Q4H PRN Felicia Reynaga MD      dextrose 5 % and sodium chloride 0 9 %  100 mL/hr Intravenous Continuous Merlin Clyde, CRNP 100 mL/hr (09/24/20 2961)    insulin lispro  1 mL Subcutaneous Insulin Pump Daily PRN Felicia Reynaga MD      insulin regular (HumuLIN R,NovoLIN R) infusion  0 3-21 Units/hr Intravenous Titrated Merlin Clyde, CRNP Stopped (09/24/20 1314)    multivitamin-minerals  1 tablet Oral Daily Merlin Mac, CRNP      ondansetron  4 mg Intravenous Q4H PRN Merlin Clyde, CRNP          Today, Patient Was Seen By: Felicia Reynaga MD    ** Please Note: Dragon 360 Dictation voice to text software may have been used in the creation of this document   **

## 2020-09-24 NOTE — ASSESSMENT & PLAN NOTE
· POA, lipase elevated at 529 on admission  Likely secondary to nausea/vomiting  · No abdominal pain or tenderness  Does report continued nausea today but no vomiting  She was able to tolerate her breakfast but reports some queasiness  · Lipase this morning is improved to 176  · Continue p r n   Antiemetics and encourage increased oral intake

## 2020-09-24 NOTE — PROGRESS NOTES
Pt being transferred to [facility]  Report given to Bebo Miller    Pt will be transported via wheel chair with her continuous iv fluids and insulin gtt, next blood sugar check is at 10 am

## 2020-09-25 VITALS
RESPIRATION RATE: 19 BRPM | WEIGHT: 124.6 LBS | HEIGHT: 61 IN | BODY MASS INDEX: 23.53 KG/M2 | TEMPERATURE: 98.1 F | HEART RATE: 76 BPM | OXYGEN SATURATION: 96 % | SYSTOLIC BLOOD PRESSURE: 113 MMHG | DIASTOLIC BLOOD PRESSURE: 74 MMHG

## 2020-09-25 LAB
25(OH)D3 SERPL-MCNC: 26.9 NG/ML (ref 30–100)
ANION GAP SERPL CALCULATED.3IONS-SCNC: 9 MMOL/L (ref 4–13)
BUN SERPL-MCNC: 6 MG/DL (ref 5–25)
CALCIUM SERPL-MCNC: 8.9 MG/DL (ref 8.3–10.1)
CHLORIDE SERPL-SCNC: 104 MMOL/L (ref 100–108)
CO2 SERPL-SCNC: 27 MMOL/L (ref 21–32)
CREAT SERPL-MCNC: 0.76 MG/DL (ref 0.6–1.3)
GFR SERPL CREATININE-BSD FRML MDRD: 103 ML/MIN/1.73SQ M
GLUCOSE SERPL-MCNC: 123 MG/DL (ref 65–140)
GLUCOSE SERPL-MCNC: 138 MG/DL (ref 65–140)
GLUCOSE SERPL-MCNC: 149 MG/DL (ref 65–140)
MAGNESIUM SERPL-MCNC: 1.9 MG/DL (ref 1.6–2.6)
PHOSPHATE SERPL-MCNC: 2.5 MG/DL (ref 2.7–4.5)
POTASSIUM SERPL-SCNC: 3.4 MMOL/L (ref 3.5–5.3)
PTH-INTACT SERPL-MCNC: 147.5 PG/ML (ref 18.4–80.1)
SODIUM SERPL-SCNC: 140 MMOL/L (ref 136–145)

## 2020-09-25 PROCEDURE — 99239 HOSP IP/OBS DSCHRG MGMT >30: CPT | Performed by: PHYSICIAN ASSISTANT

## 2020-09-25 PROCEDURE — 82948 REAGENT STRIP/BLOOD GLUCOSE: CPT

## 2020-09-25 PROCEDURE — 80048 BASIC METABOLIC PNL TOTAL CA: CPT | Performed by: INTERNAL MEDICINE

## 2020-09-25 PROCEDURE — 82306 VITAMIN D 25 HYDROXY: CPT | Performed by: INTERNAL MEDICINE

## 2020-09-25 PROCEDURE — 84100 ASSAY OF PHOSPHORUS: CPT | Performed by: INTERNAL MEDICINE

## 2020-09-25 PROCEDURE — 83735 ASSAY OF MAGNESIUM: CPT | Performed by: INTERNAL MEDICINE

## 2020-09-25 RX ORDER — B-COMPLEX WITH VITAMIN C
1 TABLET ORAL
Qty: 90 TABLET | Refills: 0 | Status: SHIPPED | OUTPATIENT
Start: 2020-09-25

## 2020-09-25 RX ADMIN — OYSTER SHELL CALCIUM WITH VITAMIN D 1 TABLET: 500; 200 TABLET, FILM COATED ORAL at 10:36

## 2020-09-25 RX ADMIN — Medication 1 TABLET: at 10:36

## 2020-09-25 NOTE — DISCHARGE INSTR - AVS FIRST PAGE
Dear Irvin Motley,     It was our pleasure to care for you here at [facility]  It is our hope that we were always able to exceed the expected standards for your care during your stay  You were hospitalized due to DKA, hypocalcemia  You were cared for on the [address] [place] [address] 4th floor by Annabel Valdes PA-C under the service of Laurel Boone MD with the [facility] who covers for your primary care physician (PCP), No primary care provider on file  , while you were hospitalized  If you have any questions or concerns related to this hospitalization, you may contact us at [phone]  For follow up as well as any medication refills, we recommend that you follow up with your primary care physician  A registered nurse will reach out to you by phone within a few days after your discharge to answer any additional questions that you may have after going home  However, at this time we provide for you here, the most important instructions / recommendations at discharge:     · Notable Medication Adjustments -   · START Calcium + D supplements - Take 1 tablet by mouth three times daily with meals   · Continue insulin pump at current settings   · Testing Required after Discharge -   · Follow up with [facility] endocrinology   · Important follow up information -   · [facility] endocrinology   · Other Instructions -   · None  · Please review this entire after visit summary as additional general instructions including medication list, appointments, activity, diet, any pertinent wound care, and other additional recommendations from your care team that may be provided for you        Sincerely,     Annabel Valdes PA-C

## 2020-09-25 NOTE — DISCHARGE SUMMARY
[facility]  Discharge- Xavier Rondon 1985, 37 y o  female MRN: [id number]    Unit/Bed#: W -01 Encounter: [id number]    Primary Care Provider: No primary care provider on file  Date and time admitted to hospital: 9/23/2020  3:54 PM        * Diabetic ketoacidosis without coma associated with type 1 diabetes mellitus [facility])  Assessment & Plan  Lab Results   Component Value Date    HGBA1C 8 9 (H) 03/11/2020     Recent Labs     09/24/20  1617 09/24/20 2029 09/25/20  0810 09/25/20  1053   POCGLU 229* 198* 123 138     Blood Sugar Average: Last 72 hrs:  · (P) 150 25   · Patient with history of type 1 diabetes on insulin pump presenting with a days history nausea, vomiting  Also reports uncontrolled blood glucose since onset  Workup in the ED revealed elevated blood glucose in the 200s with positive beta hydroxybutyrate, elevated anion gap at 22  After insulin drip patient's anion gap closed and she is back on her insulin pump with controlled sugars  · Stable for discharge   · Continue current pump settings   · Follow up with [facility] endocrinology       Elevated lipase  Assessment & Plan  · POA, lipase elevated at 529 on admission, now improved to normal    Likely secondary to nausea/vomiting  No abdominal pain or tenderness  Symptoms of nausea and vomiting have resolved   · No further work up at this time     Hypocalcemia  Assessment & Plan  · Improved to 8 9, likely in setting of vitamin D deficiency   · Start Calcium + D supplementation       Discharging Physician / Practitioner: Pawel Bustamante PA-C  PCP: No primary care provider on file    Admission Date:   Admission Orders (From admission, onward)     Ordered        09/23/20 1838  Inpatient Admission  Once                   Discharge Date: 09/25/20    Resolved Problems  Date Reviewed: 9/25/2020    None          Consultations During Hospital Stay:  · Endocrinology - Dr  Kim Halo     Procedures Performed:   · CXR    Significant Findings / Test Results:   · CXR: No acute pulmonary disease     Incidental Findings:   · None     Test Results Pending at Discharge (will require follow up): · None     Outpatient Tests Requested:  · Follow up with outpatient endocrinology     Complications:  None    Reason for Admission: DKA    Hospital Course:     Jason aLkhani is a 37 y o  female patient who originally presented to the hospital on 9/23/2020 due to nausea and vomiting  Patient was found to be in DKA  She was started on an insulin drip and admitted under DKA protocol  Her anion gap closed nicely and she was able to transition back to her pump settings as advised by Endocrinology  Her calcium was noted to be low and supplementation was started with quick improvement to normal  This was likely due to vitamin D deficiency  She was discharged in stable condition and will follow up with her outpatient endocrinologist         Please see above list of diagnoses and related plan for additional information  Condition at Discharge: stable     Discharge Day Visit / Exam:     Subjective:  Patient states that she is feeling well  Reports that her appetite is improved  Denies nausea or vomiting  Excited to go home  Vitals: Blood Pressure: 113/74 (09/25/20 0810)  Pulse: 76 (09/25/20 0810)  Temperature: 98 1 °F (36 7 °C) (09/25/20 0810)  Temp Source: Oral (09/24/20 2155)  Respirations: 19 (09/25/20 0810)  Height: 5' 1" (154 9 cm) (09/24/20 6526)  Weight - Scale: 56 5 kg (124 lb 9 6 oz) (09/23/20 1517)  SpO2: 96 % (09/25/20 0810)  Exam:   Physical Exam  Constitutional:       General: She is not in acute distress  Appearance: Normal appearance  She is normal weight  She is not ill-appearing or diaphoretic  HENT:      Head: Normocephalic and atraumatic  Mouth/Throat:      Mouth: Mucous membranes are moist    Eyes:      General: No scleral icterus  Pupils: Pupils are equal, round, and reactive to light     Cardiovascular:      Rate and Rhythm: Normal rate and regular rhythm  Pulses: Normal pulses  Heart sounds: Normal heart sounds, S1 normal and S2 normal  No murmur  No systolic murmur  No diastolic murmur  No gallop  No S3 or S4 sounds  Pulmonary:      Effort: Pulmonary effort is normal  No accessory muscle usage or respiratory distress  Breath sounds: Normal breath sounds  No stridor  No decreased breath sounds, wheezing, rhonchi or rales  Chest:      Chest wall: No tenderness  Abdominal:      General: Bowel sounds are normal  There is no distension  Palpations: Abdomen is soft  Tenderness: There is no abdominal tenderness  There is no guarding  Musculoskeletal:      Right lower leg: No edema  Left lower leg: No edema  Skin:     General: Skin is warm and dry  Coloration: Skin is not jaundiced  Neurological:      General: No focal deficit present  Mental Status: She is alert  Mental status is at baseline  Motor: No tremor or seizure activity  Psychiatric:         Behavior: Behavior is cooperative  Discussion with Family: None requested     Discharge instructions/Information to patient and family:   See after visit summary for information provided to patient and family  Provisions for Follow-Up Care:  See after visit summary for information related to follow-up care and any pertinent home health orders  Disposition:     Home    For Discharges to [facility] SNF:   · Not Applicable to this Patient - Not Applicable to this Patient    Planned Readmission: None     Discharge Statement:  I spent 45 minutes discharging the patient  This time was spent on the day of discharge  I had direct contact with the patient on the day of discharge  Greater than 50% of the total time was spent examining patient, answering all patient questions, arranging and discussing plan of care with patient as well as directly providing post-discharge instructions    Additional time then spent on discharge activities  Discharge Medications:  See after visit summary for reconciled discharge medications provided to patient and family        ** Please Note: This note has been constructed using a voice recognition system **

## 2020-09-25 NOTE — ASSESSMENT & PLAN NOTE
Lab Results   Component Value Date    HGBA1C 8 9 (H) 03/11/2020     Recent Labs     09/24/20  1617 09/24/20 2029 09/25/20  0810 09/25/20  1053   POCGLU 229* 198* 123 138     Blood Sugar Average: Last 72 hrs:  · (P) 150 25   · Patient with history of type 1 diabetes on insulin pump presenting with a days history nausea, vomiting  Also reports uncontrolled blood glucose since onset  Workup in the ED revealed elevated blood glucose in the 200s with positive beta hydroxybutyrate, elevated anion gap at 22  After insulin drip patient's anion gap closed and she is back on her insulin pump with controlled sugars     · Stable for discharge   · Continue current pump settings   · Follow up with [facility] endocrinology

## 2020-09-25 NOTE — ASSESSMENT & PLAN NOTE
· POA, lipase elevated at 529 on admission, now improved to normal    Likely secondary to nausea/vomiting  No abdominal pain or tenderness   Symptoms of nausea and vomiting have resolved   · No further work up at this time

## 2020-10-05 NOTE — UTILIZATION REVIEW
Notification of Discharge  This is a Notification of Discharge from our facility [address]  Please be advised that this patient has been discharge from our facility  Below you will find the admission and discharge date and time including the patients disposition  PRESENTATION DATE: 9/23/2020  3:54 PM  OBS ADMISSION DATE:   IP ADMISSION DATE: 9/23/20 1838   DISCHARGE DATE: 9/25/2020  3:03 PM  DISPOSITION: Home/Self Care Home/Self Care   Admission Orders listed below:  Admission Orders (From admission, onward)     Ordered        09/23/20 1838  Inpatient Admission  Once                   Please contact the UR Department if additional information is required to close this patient's authorization/case  [address] [employer] Utilization Review Department  Main: [phone] x carefully listen to the prompts  All voicemails are confidential   [email or web address]  Send all requests for admission clinical reviews, approved or denied determinations and any other requests to dedicated fax number below belonging to the campus where the patient is receiving treatment   List of dedicated fax numbers:  [address] DENIALS (Administrative/Medical Necessity) [phone]   [address] (Maternity/NICU/Pediatrics) [phone]   Conda Sas [phone]   Jamas Piggs [phone]   Lanie Dress [phone]   Levell Pleasure [facility] [address] [place] [address] [phone]   [facility]  [phone]   [address] [facility] [address] [facility]  [address] [address] [address] [facility] [phone]

## 2021-10-13 ENCOUNTER — CONSULT (OUTPATIENT)
Dept: MULTI SPECIALTY CLINIC | Facility: CLINIC | Age: 36
End: 2021-10-13

## 2021-10-13 VITALS — HEIGHT: 60 IN | BODY MASS INDEX: 25.32 KG/M2 | WEIGHT: 129 LBS

## 2021-10-13 DIAGNOSIS — L80 VITILIGO: ICD-10-CM

## 2021-10-13 PROCEDURE — 99244 OFF/OP CNSLTJ NEW/EST MOD 40: CPT | Performed by: DERMATOLOGY

## 2021-10-13 RX ORDER — NAPROXEN 500 MG/1
500 TABLET ORAL
COMMUNITY
Start: 2021-01-18 | End: 2022-01-18

## 2021-10-13 RX ORDER — TACROLIMUS 1 MG/G
OINTMENT TOPICAL 2 TIMES DAILY
Qty: 100 G | Refills: 3 | Status: SHIPPED | OUTPATIENT
Start: 2021-10-13 | End: 2021-10-25 | Stop reason: SDUPTHER

## 2021-10-13 RX ORDER — BLOOD SUGAR DIAGNOSTIC
STRIP MISCELLANEOUS
COMMUNITY
Start: 2021-10-07

## 2021-10-13 RX ORDER — LANCETS
EACH MISCELLANEOUS
COMMUNITY
Start: 2021-10-07

## 2021-10-18 ENCOUNTER — TELEPHONE (OUTPATIENT)
Dept: DERMATOLOGY | Facility: CLINIC | Age: 36
End: 2021-10-18

## 2021-10-22 ENCOUNTER — TELEPHONE (OUTPATIENT)
Dept: DERMATOLOGY | Facility: CLINIC | Age: 36
End: 2021-10-22

## 2021-10-25 ENCOUNTER — TELEPHONE (OUTPATIENT)
Dept: INTERNAL MEDICINE CLINIC | Facility: CLINIC | Age: 36
End: 2021-10-25

## 2021-10-25 ENCOUNTER — TELEPHONE (OUTPATIENT)
Dept: DERMATOLOGY | Facility: CLINIC | Age: 36
End: 2021-10-25

## 2021-10-25 DIAGNOSIS — L80 VITILIGO: ICD-10-CM

## 2021-10-26 RX ORDER — TACROLIMUS 1 MG/G
OINTMENT TOPICAL 2 TIMES DAILY
Qty: 100 G | Refills: 3 | Status: SHIPPED | OUTPATIENT
Start: 2021-10-26 | End: 2021-10-27 | Stop reason: SDUPTHER

## 2021-10-27 RX ORDER — TACROLIMUS 1 MG/G
OINTMENT TOPICAL 2 TIMES DAILY
Qty: 100 G | Refills: 3 | Status: SHIPPED | OUTPATIENT
Start: 2021-10-27 | End: 2021-10-29

## 2021-10-28 ENCOUNTER — TELEPHONE (OUTPATIENT)
Dept: INTERNAL MEDICINE CLINIC | Facility: CLINIC | Age: 36
End: 2021-10-28

## 2021-10-29 DIAGNOSIS — L80 VITILIGO: Primary | ICD-10-CM

## 2021-10-29 RX ORDER — TACROLIMUS 0.1 %
OINTMENT (GRAM) TOPICAL 2 TIMES DAILY
Qty: 100 G | Refills: 3 | Status: SHIPPED | OUTPATIENT
Start: 2021-10-29 | End: 2021-11-23

## 2021-11-01 ENCOUNTER — OFFICE VISIT (OUTPATIENT)
Dept: DERMATOLOGY | Facility: CLINIC | Age: 36
End: 2021-11-01
Payer: COMMERCIAL

## 2021-11-01 ENCOUNTER — TELEPHONE (OUTPATIENT)
Dept: DERMATOLOGY | Facility: CLINIC | Age: 36
End: 2021-11-01

## 2021-11-01 DIAGNOSIS — L80 VITILIGO: Primary | ICD-10-CM

## 2021-11-01 PROCEDURE — 96900 ACTINOTHERAPY UV LIGHT: CPT | Performed by: DERMATOLOGY

## 2021-11-03 ENCOUNTER — OFFICE VISIT (OUTPATIENT)
Dept: DERMATOLOGY | Facility: CLINIC | Age: 36
End: 2021-11-03
Payer: COMMERCIAL

## 2021-11-03 DIAGNOSIS — L80 VITILIGO: Primary | ICD-10-CM

## 2021-11-03 PROCEDURE — 96900 ACTINOTHERAPY UV LIGHT: CPT | Performed by: DERMATOLOGY

## 2021-11-08 ENCOUNTER — OFFICE VISIT (OUTPATIENT)
Dept: DERMATOLOGY | Facility: CLINIC | Age: 36
End: 2021-11-08
Payer: COMMERCIAL

## 2021-11-08 DIAGNOSIS — L80 VITILIGO: Primary | ICD-10-CM

## 2021-11-08 PROCEDURE — 96900 ACTINOTHERAPY UV LIGHT: CPT | Performed by: DERMATOLOGY

## 2021-11-10 ENCOUNTER — OFFICE VISIT (OUTPATIENT)
Dept: DERMATOLOGY | Facility: CLINIC | Age: 36
End: 2021-11-10
Payer: COMMERCIAL

## 2021-11-10 DIAGNOSIS — L80 VITILIGO: Primary | ICD-10-CM

## 2021-11-10 PROCEDURE — 96900 ACTINOTHERAPY UV LIGHT: CPT | Performed by: DERMATOLOGY

## 2021-11-11 ENCOUNTER — TELEPHONE (OUTPATIENT)
Dept: DERMATOLOGY | Facility: CLINIC | Age: 36
End: 2021-11-11

## 2021-11-15 ENCOUNTER — OFFICE VISIT (OUTPATIENT)
Dept: DERMATOLOGY | Facility: CLINIC | Age: 36
End: 2021-11-15
Payer: COMMERCIAL

## 2021-11-15 DIAGNOSIS — L80 VITILIGO: Primary | ICD-10-CM

## 2021-11-15 PROCEDURE — 96900 ACTINOTHERAPY UV LIGHT: CPT | Performed by: DERMATOLOGY

## 2021-11-18 ENCOUNTER — OFFICE VISIT (OUTPATIENT)
Dept: DERMATOLOGY | Facility: CLINIC | Age: 36
End: 2021-11-18
Payer: COMMERCIAL

## 2021-11-18 DIAGNOSIS — L80 VITILIGO: Primary | ICD-10-CM

## 2021-11-18 PROCEDURE — 96900 ACTINOTHERAPY UV LIGHT: CPT | Performed by: DERMATOLOGY

## 2021-11-22 ENCOUNTER — OFFICE VISIT (OUTPATIENT)
Dept: DERMATOLOGY | Facility: CLINIC | Age: 36
End: 2021-11-22
Payer: COMMERCIAL

## 2021-11-22 DIAGNOSIS — L80 VITILIGO: Primary | ICD-10-CM

## 2021-11-22 PROCEDURE — 96900 ACTINOTHERAPY UV LIGHT: CPT | Performed by: DERMATOLOGY

## 2021-11-23 RX ORDER — TACROLIMUS 1 MG/G
OINTMENT TOPICAL 2 TIMES DAILY
Qty: 60 G | Refills: 1 | Status: SHIPPED | OUTPATIENT
Start: 2021-11-23 | End: 2022-02-16 | Stop reason: SDUPTHER

## 2021-11-24 ENCOUNTER — OFFICE VISIT (OUTPATIENT)
Dept: DERMATOLOGY | Facility: CLINIC | Age: 36
End: 2021-11-24
Payer: COMMERCIAL

## 2021-11-24 DIAGNOSIS — L80 VITILIGO: Primary | ICD-10-CM

## 2021-11-24 PROCEDURE — 96910 PHOTCHMTX TAR&UVB/PTRLTM&UVB: CPT | Performed by: DERMATOLOGY

## 2021-11-29 ENCOUNTER — OFFICE VISIT (OUTPATIENT)
Dept: DERMATOLOGY | Facility: CLINIC | Age: 36
End: 2021-11-29
Payer: COMMERCIAL

## 2021-11-29 DIAGNOSIS — L80 VITILIGO: Primary | ICD-10-CM

## 2021-11-29 PROCEDURE — 96900 ACTINOTHERAPY UV LIGHT: CPT | Performed by: DERMATOLOGY

## 2021-12-01 ENCOUNTER — OFFICE VISIT (OUTPATIENT)
Dept: DERMATOLOGY | Facility: CLINIC | Age: 36
End: 2021-12-01
Payer: COMMERCIAL

## 2021-12-01 DIAGNOSIS — L80 VITILIGO: Primary | ICD-10-CM

## 2021-12-01 PROCEDURE — 96910 PHOTCHMTX TAR&UVB/PTRLTM&UVB: CPT | Performed by: DERMATOLOGY

## 2021-12-06 ENCOUNTER — OFFICE VISIT (OUTPATIENT)
Dept: DERMATOLOGY | Facility: CLINIC | Age: 36
End: 2021-12-06
Payer: COMMERCIAL

## 2021-12-06 DIAGNOSIS — L80 VITILIGO: Primary | ICD-10-CM

## 2021-12-06 PROCEDURE — 96900 ACTINOTHERAPY UV LIGHT: CPT | Performed by: DERMATOLOGY

## 2021-12-13 ENCOUNTER — OFFICE VISIT (OUTPATIENT)
Dept: DERMATOLOGY | Facility: CLINIC | Age: 36
End: 2021-12-13
Payer: COMMERCIAL

## 2021-12-13 DIAGNOSIS — L80 VITILIGO: Primary | ICD-10-CM

## 2021-12-13 PROCEDURE — 96900 ACTINOTHERAPY UV LIGHT: CPT | Performed by: DERMATOLOGY

## 2021-12-15 ENCOUNTER — OFFICE VISIT (OUTPATIENT)
Dept: DERMATOLOGY | Facility: CLINIC | Age: 36
End: 2021-12-15
Payer: COMMERCIAL

## 2021-12-15 DIAGNOSIS — L80 VITILIGO: Primary | ICD-10-CM

## 2021-12-15 PROCEDURE — 96910 PHOTCHMTX TAR&UVB/PTRLTM&UVB: CPT | Performed by: DERMATOLOGY

## 2021-12-20 ENCOUNTER — OFFICE VISIT (OUTPATIENT)
Dept: DERMATOLOGY | Facility: CLINIC | Age: 36
End: 2021-12-20
Payer: COMMERCIAL

## 2021-12-20 DIAGNOSIS — L80 VITILIGO: Primary | ICD-10-CM

## 2021-12-20 PROCEDURE — 96910 PHOTCHMTX TAR&UVB/PTRLTM&UVB: CPT | Performed by: DERMATOLOGY

## 2021-12-22 ENCOUNTER — OFFICE VISIT (OUTPATIENT)
Dept: DERMATOLOGY | Facility: CLINIC | Age: 36
End: 2021-12-22
Payer: COMMERCIAL

## 2021-12-22 DIAGNOSIS — L80 VITILIGO: Primary | ICD-10-CM

## 2021-12-22 PROCEDURE — 96910 PHOTCHMTX TAR&UVB/PTRLTM&UVB: CPT | Performed by: DERMATOLOGY

## 2021-12-29 ENCOUNTER — OFFICE VISIT (OUTPATIENT)
Dept: DERMATOLOGY | Facility: CLINIC | Age: 36
End: 2021-12-29
Payer: COMMERCIAL

## 2021-12-29 DIAGNOSIS — L80 VITILIGO: Primary | ICD-10-CM

## 2021-12-29 PROCEDURE — 96910 PHOTCHMTX TAR&UVB/PTRLTM&UVB: CPT | Performed by: DERMATOLOGY

## 2022-01-12 ENCOUNTER — OFFICE VISIT (OUTPATIENT)
Dept: DERMATOLOGY | Facility: CLINIC | Age: 37
End: 2022-01-12
Payer: COMMERCIAL

## 2022-01-12 DIAGNOSIS — L80 VITILIGO: Primary | ICD-10-CM

## 2022-01-12 PROCEDURE — 96910 PHOTCHMTX TAR&UVB/PTRLTM&UVB: CPT | Performed by: DERMATOLOGY

## 2022-01-12 NOTE — PROGRESS NOTES
PHOTOTHERAPY NURSE VISIT    Physical Exam   Diagnosis: Vitiligo    Anatomic location affected: Bilateral forearms, hands, lower legs, and feet    BSA: 40%    Plan:   Treatment Schedule: 2-3x/week   Reaction from previous therapy: None   mJoules today: 782 - dose reduced by 25% due to pt missing 2 weeks of treatment    Topical Applied: yes - mineral oil    Secondary Treatment: None  Based on a thorough discussion of this condition and the management approach to it (including a comprehensive discussion of the known risks, side effects and potential benefits of treatment), the patient (family) agrees to implement the following specific plan:    Patient wore appropriate eye protection, protective clothing to cover unaffected skin, approved footwear if needed   Topical was applied by nurse     All questions were answered no complications reported   Patient's next scheduled appointment: 1/14/2022

## 2022-01-14 ENCOUNTER — OFFICE VISIT (OUTPATIENT)
Dept: DERMATOLOGY | Facility: CLINIC | Age: 37
End: 2022-01-14
Payer: COMMERCIAL

## 2022-01-14 DIAGNOSIS — L80 VITILIGO: Primary | ICD-10-CM

## 2022-01-14 PROCEDURE — 96910 PHOTCHMTX TAR&UVB/PTRLTM&UVB: CPT | Performed by: DERMATOLOGY

## 2022-01-14 NOTE — PROGRESS NOTES
PHOTOTHERAPY NURSE VISIT    Physical Exam   Diagnosis: Vitiligo    Anatomic location affected: Bilateral forearms, hands, lower legs, and feet    BSA: 40%    Plan:   Treatment Schedule: 2-3x/week   Reaction from previous therapy: None   mJoules today: 858 (Increased by 10%, maximum of 2000 mJ)    Topical Applied: yes - mineral oil    Secondary Treatment: None      Based on a thorough discussion of this condition and the management approach to it (including a comprehensive discussion of the known risks, side effects and potential benefits of treatment), the patient (family) agrees to implement the following specific plan:    Patient wore appropriate eye protection, protective clothing to cover unaffected skin, approved footwear if needed   Topical was applied by nurse     All questions were answered no complications reported   Patient's next scheduled appointment: 1/19/2022

## 2022-01-19 ENCOUNTER — OFFICE VISIT (OUTPATIENT)
Dept: DERMATOLOGY | Facility: CLINIC | Age: 37
End: 2022-01-19
Payer: COMMERCIAL

## 2022-01-19 DIAGNOSIS — L80 VITILIGO: Primary | ICD-10-CM

## 2022-01-19 PROCEDURE — 96900 ACTINOTHERAPY UV LIGHT: CPT | Performed by: DERMATOLOGY

## 2022-01-19 NOTE — PROGRESS NOTES
PHOTOTHERAPY NURSE VISIT    Physical Exam   Diagnosis: Vitiligo    Anatomic location affected: Bilateral forearms, hands, lower legs, and feet    BSA: 40%    Plan:   Treatment Schedule: 2-3x/week   Reaction from previous therapy: None   mJoules today: 942 (Increased by 10%, maximum of 2000 mJ)    Topical Applied: yes - mineral oil    Secondary Treatment: None  Based on a thorough discussion of this condition and the management approach to it (including a comprehensive discussion of the known risks, side effects and potential benefits of treatment), the patient (family) agrees to implement the following specific plan:    Patient wore appropriate eye protection, protective clothing to cover unaffected skin, approved footwear if needed   Topical was applied by patient     All questions were answered no complications reported   Patient's next scheduled appointment: 1/21/2022

## 2022-01-21 ENCOUNTER — OFFICE VISIT (OUTPATIENT)
Dept: DERMATOLOGY | Facility: CLINIC | Age: 37
End: 2022-01-21
Payer: COMMERCIAL

## 2022-01-21 DIAGNOSIS — L80 VITILIGO: Primary | ICD-10-CM

## 2022-01-21 PROCEDURE — 96900 ACTINOTHERAPY UV LIGHT: CPT | Performed by: DERMATOLOGY

## 2022-01-21 NOTE — PROGRESS NOTES
PHOTOTHERAPY NURSE VISIT    Physical Exam   Diagnosis: Vitiligo    Anatomic location affected: Bilateral forearms, hands, lower legs, and feet    BSA: 40%    Plan:   Treatment Schedule: 2-3x/week   Reaction from previous therapy: None   mJoules today: 1754 (Increased by 10%, maximum of 2000 mJ)   Topical Applied: yes - mineral oil    Secondary Treatment: None      Based on a thorough discussion of this condition and the management approach to it (including a comprehensive discussion of the known risks, side effects and potential benefits of treatment), the patient (family) agrees to implement the following specific plan:    Patient wore appropriate eye protection, protective clothing to cover unaffected skin, approved footwear if needed   Topical was applied by patient     All questions were answered no complications reported   Patient's next scheduled appointment: 1/24/2022

## 2022-01-24 ENCOUNTER — OFFICE VISIT (OUTPATIENT)
Dept: DERMATOLOGY | Facility: CLINIC | Age: 37
End: 2022-01-24
Payer: COMMERCIAL

## 2022-01-24 DIAGNOSIS — L80 VITILIGO: Primary | ICD-10-CM

## 2022-01-24 PROCEDURE — 96900 ACTINOTHERAPY UV LIGHT: CPT | Performed by: DERMATOLOGY

## 2022-01-24 NOTE — PROGRESS NOTES
PHOTOTHERAPY NURSE VISIT     Physical Exam  · Diagnosis: Vitiligo   · Anatomic location affected: Bilateral forearms, hands, lower legs, and feet   · BSA: 40%     Plan:  · Treatment Schedule: 2-3x/week  · Reaction from previous therapy: None  · mJoules today: 6065 (Increased by 10%, maximum of 2000 mJ)  · Topical Applied: yes - mineral oil   · Secondary Treatment: None        Based on a thorough discussion of this condition and the management approach to it (including a comprehensive discussion of the known risks, side effects and potential benefits of treatment), the patient (family) agrees to implement the following specific plan:  ·  Patient wore appropriate eye protection, protective clothing to cover unaffected skin, approved footwear if needed  · Topical was applied by patient    · All questions were answered no complications reported  · Patient's next scheduled appointment: 1/26/2022

## 2022-01-26 ENCOUNTER — OFFICE VISIT (OUTPATIENT)
Dept: DERMATOLOGY | Facility: CLINIC | Age: 37
End: 2022-01-26
Payer: COMMERCIAL

## 2022-01-26 DIAGNOSIS — L80 VITILIGO: Primary | ICD-10-CM

## 2022-01-26 PROCEDURE — 96900 ACTINOTHERAPY UV LIGHT: CPT | Performed by: DERMATOLOGY

## 2022-01-26 NOTE — PROGRESS NOTES
PHOTOTHERAPY NURSE VISIT    Physical Exam   Diagnosis: Vitiligp   Anatomic location affected: Bilateral forearms, hands, lower legs and feet   BSA: 40%    Plan:   Treatment Schedule: 2-3x/week   Reaction from previous therapy: None   mJoules today: 1152 mJ ( Increased by 10%, maximum of 2000 mJ)   Topical Applied: yes - Mineral Oil    Secondary Treatment: None      Based on a thorough discussion of this condition and the management approach to it (including a comprehensive discussion of the known risks, side effects and potential benefits of treatment), the patient (family) agrees to implement the following specific plan:    Patient wore appropriate eye protection, protective clothing to cover unaffected skin, approved footwear if needed     Topical was applied by patient   All questions were answered no complications reported   Patient's next scheduled appointment: Monday 1/31/22

## 2022-01-31 ENCOUNTER — OFFICE VISIT (OUTPATIENT)
Dept: DERMATOLOGY | Facility: CLINIC | Age: 37
End: 2022-01-31
Payer: COMMERCIAL

## 2022-01-31 DIAGNOSIS — L80 VITILIGO: Primary | ICD-10-CM

## 2022-01-31 PROCEDURE — 96900 ACTINOTHERAPY UV LIGHT: CPT | Performed by: DERMATOLOGY

## 2022-01-31 NOTE — PROGRESS NOTES
PHOTOTHERAPY NURSE VISIT    Physical Exam   Diagnosis: Vitiligo    Anatomic location affected: Bilateral forearms, hands, lower legs, and feet    BSA: 40%    Plan:   Treatment Schedule: 2-3x/week   Reaction from previous therapy: None   mJoules today: 7913 (Increased by 10%, maximum of 2000 mJ)   Topical Applied: yes - mineral oil    Secondary Treatment: None  Based on a thorough discussion of this condition and the management approach to it (including a comprehensive discussion of the known risks, side effects and potential benefits of treatment), the patient (family) agrees to implement the following specific plan:    Patient wore appropriate eye protection, protective clothing to cover unaffected skin, approved footwear if needed   Topical was applied by patient     All questions were answered no complications reported   Patient's next scheduled appointment: 2/4/2022

## 2022-02-04 ENCOUNTER — OFFICE VISIT (OUTPATIENT)
Dept: DERMATOLOGY | Facility: CLINIC | Age: 37
End: 2022-02-04
Payer: COMMERCIAL

## 2022-02-04 DIAGNOSIS — L80 VITILIGO: Primary | ICD-10-CM

## 2022-02-04 PROCEDURE — 96900 ACTINOTHERAPY UV LIGHT: CPT | Performed by: DERMATOLOGY

## 2022-02-04 NOTE — PROGRESS NOTES
PHOTOTHERAPY NURSE VISIT    Physical Exam   Diagnosis: Vitiligo    Anatomic location affected: Bilateral forearms, hands, lower legs, and feet    BSA: 40%    Plan:   Treatment Schedule: 2-3x/week   Reaction from previous therapy: None   mJoules today: 4619 (Increased by 10%, maximum of 2000 mJ)   Topical Applied: yes - mineral oil    Secondary Treatment: None  Based on a thorough discussion of this condition and the management approach to it (including a comprehensive discussion of the known risks, side effects and potential benefits of treatment), the patient (family) agrees to implement the following specific plan:    Patient wore appropriate eye protection, protective clothing to cover unaffected skin, approved footwear if needed   Topical was applied by patient     All questions were answered no complications reported   Patient's next scheduled appointment: 2/8/2022

## 2022-02-07 ENCOUNTER — OFFICE VISIT (OUTPATIENT)
Dept: DERMATOLOGY | Facility: CLINIC | Age: 37
End: 2022-02-07
Payer: COMMERCIAL

## 2022-02-07 DIAGNOSIS — L80 VITILIGO: Primary | ICD-10-CM

## 2022-02-07 PROCEDURE — 96900 ACTINOTHERAPY UV LIGHT: CPT | Performed by: DERMATOLOGY

## 2022-02-07 NOTE — PROGRESS NOTES
PHOTOTHERAPY NURSE VISIT    Physical Exam   Diagnosis: Vitiligo    Anatomic location affected: Bilateral forearms, hands, lower legs, and feet    BSA: 40%    Plan:   Treatment Schedule: 2-3x/week   Reaction from previous therapy: Mild erythema present on finger tips that is no longer present  Denied any pain  Dose held the same today   mJoules today: 1402 - dose held the same today; Typically would increased by 10%, maximum of 2000 mJ   Topical Applied: yes - mineral oil    Secondary Treatment: None  Based on a thorough discussion of this condition and the management approach to it (including a comprehensive discussion of the known risks, side effects and potential benefits of treatment), the patient (family) agrees to implement the following specific plan:    Patient wore appropriate eye protection, protective clothing to cover unaffected skin, approved footwear if needed   Topical was applied by patient     All questions were answered no complications reported   Patient's next scheduled appointment: 2/9/2022

## 2022-02-14 ENCOUNTER — OFFICE VISIT (OUTPATIENT)
Dept: DERMATOLOGY | Facility: CLINIC | Age: 37
End: 2022-02-14
Payer: COMMERCIAL

## 2022-02-14 DIAGNOSIS — L80 VITILIGO: Primary | ICD-10-CM

## 2022-02-14 PROCEDURE — 96900 ACTINOTHERAPY UV LIGHT: CPT | Performed by: DERMATOLOGY

## 2022-02-14 NOTE — PROGRESS NOTES
PHOTOTHERAPY NURSE VISIT    Physical Exam   Diagnosis: Vitiligo    Anatomic location affected: Bilateral forearms, hands, lower legs, and feet    BSA: 40%    Plan:   Treatment Schedule: 2-3x/week   Reaction from previous therapy: None   mJoules today: 1405 - dose held the same today due to pt missing 1 week of treatment; Typically would increase by 10%, maximum of 2000 mJ   Topical Applied: yes - mineral oil    Secondary Treatment: None      Based on a thorough discussion of this condition and the management approach to it (including a comprehensive discussion of the known risks, side effects and potential benefits of treatment), the patient (family) agrees to implement the following specific plan:    Patient wore appropriate eye protection, protective clothing to cover unaffected skin, approved footwear if needed   Topical was applied by patient     All questions were answered no complications reported   Patient's next scheduled appointment: 2/18/2022

## 2022-02-16 ENCOUNTER — OFFICE VISIT (OUTPATIENT)
Dept: MULTI SPECIALTY CLINIC | Facility: CLINIC | Age: 37
End: 2022-02-16

## 2022-02-16 VITALS — HEIGHT: 60 IN | BODY MASS INDEX: 27.25 KG/M2 | TEMPERATURE: 98.1 F | WEIGHT: 138.8 LBS

## 2022-02-16 DIAGNOSIS — L80 VITILIGO: ICD-10-CM

## 2022-02-16 PROCEDURE — 99213 OFFICE O/P EST LOW 20 MIN: CPT | Performed by: DERMATOLOGY

## 2022-02-16 RX ORDER — TACROLIMUS 1 MG/G
OINTMENT TOPICAL 2 TIMES DAILY
Qty: 60 G | Refills: 1 | Status: SHIPPED | OUTPATIENT
Start: 2022-02-16

## 2022-02-16 NOTE — PATIENT INSTRUCTIONS
VITILIGO        Assessment and Plan:  Based on a thorough discussion of this condition and the management approach to it (including a comprehensive discussion of the known risks, side effects and potential benefits of treatment), the patient (family) agrees to implement the following specific plan:  · Continue phototherapy 2-3 times per week  Patient would like to be scheduled at [address] [place] [address]  · Discussed treatment options today  · Continue topical treatment with protopic 0 1% ointment   Apply two times daily to areas of pigment loss  · Follow up in 2 months

## 2022-02-16 NOTE — PROGRESS NOTES
[facility] Follow Up Note    Patient Name: Tarah Pinto  Encounter Date: 2/16/2022    Today's Chief Concerns:  Catherine Benavides Concern #1:  Follow up vitiligo     Current Medications:    Current Outpatient Medications:     Accu-Chek FastClix Lancets MISC, USE TO TEST BLOOD SUGAR 6 TIMES A DAY, Disp: , Rfl:     Accu-Chek Guide test strip, USE UP TO 6 TIMES A DAY, Disp: , Rfl:     Biotin 1 MG CAPS, Take by mouth, Disp: , Rfl:     calcium carbonate-vitamin D (OSCAL-D) 500 mg-200 units per tablet, Take 1 tablet by mouth 3 (three) times a day with meals, Disp: 90 tablet, Rfl: 0    insulin aspart (NovoLOG) 100 units/mL injection, Inject under the skin continuous  Via pump, Disp: , Rfl:     insulin lispro (Admelog) 100 units/mL injection, INJECT 60 UNITS VIA INSULIN PUMP DAILY, Disp: , Rfl:     multivitamin (THERAGRAN) TABS, Take 1 tablet by mouth daily, Disp: , Rfl:     tacrolimus (PROTOPIC) 0 1 % ointment, Apply topically 2 (two) times a day, Disp: 60 g, Rfl: 1    naproxen (NAPROSYN) 500 mg tablet, Take 500 mg by mouth, Disp: , Rfl:     CONSTITUTIONAL:   Vitals:    02/16/22 1000   Temp: 98 1 °F (36 7 °C)   TempSrc: Probe   Weight: 63 kg (138 lb 12 8 oz)   Height: 5' (1 524 m)           Specific Alerts:    Have you been seen by a [facility]atologist in the last 3 years? YES    Are you pregnant or planning to become pregnant? No    Are you currently or planning to be nursing or breast feeding? No    Allergies   Allergen Reactions    Augmentin [Amoxicillin-Pot Clavulanate] Rash       May we call your Preferred Phone number to discuss your specific medical information? YES    May we leave a detailed message that includes your specific medical information? YES    Have you traveled outside of the [facility] in the past 3 months? No    Do you currently have a pacemaker or defibrillator?  No    Do you have any artificial heart valves, joints, plates, screws, rods, stents, pins, etc? No   - If Yes, were any placed within the last 2 years? Do you require any medications prior to a surgical procedure? No     Are you taking any medications that cause you to bleed more easily ("blood thinners") No    Have you ever experienced a rapid heartbeat with epinephrine? No    Have you ever been treated with "gold" (gold sodium thiomalate) therapy? No    [facility] can help with wrinkles, "laugh lines," facial volume loss, "double chin," "love handles," age spots, and more  Are you interested in learning today about some of the skin enhancement procedures that we offer? (If Yes, please provide more detail) No    Review of Systems:  Have you recently had or currently have any of the following?     · Fever or chills: No  · Night Sweats: No  · Headaches: No  · Weight Gain: No  · Weight Loss: No  · Blurry Vision: No  · Nausea: No  · Vomiting: No  · Diarrhea: No  · Blood in Stool: No  · Abdominal Pain: No  · Itchy Skin: No  · Painful Joints: No  · Swollen Joints: No  · Muscle Pain: No  · Irregular Mole: No  · Sun Burn: No  · Dry Skin: No  · Skin Color Changes: No  · Scar or Keloid: No  · Cold Sores/Fever Blisters: No  · Bacterial Infections/MRSA: No  · Anxiety: No  · Depression: No  · Suicidal or Homicidal Thoughts: No      PSYCH: Normal mood and affect  EYES: Normal conjunctiva  ENT: Normal lips and oral mucosa  CARDIOVASCULAR: No edema  RESPIRATORY: Normal respirations  HEME/LYMPH/IMMUNO:  No regional lymphadenopathy except as noted below in ASSESSMENT AND PLAN BY DIAGNOSIS    FULL ORGAN SYSTEM SKIN EXAM (SKIN)   Face Normal except as noted below in Assessment   Neck Normal except as noted below in Assessment   Right Arm/Hand/Fingers Normal except as noted below in Assessment   Left Arm/Hand/Fingers Normal except as noted below in Assessment   Right Axilla Viewed areas Normal except as noted below in Assessment           VITILIGO     Physical Exam:  · Anatomic Location Affected:  Bilateral forearms, hands, lower legs, feet  · Morphological Description:  Depigmented patches with perifollicular repigmentation on forearms, dorsal hands, and lower legs  · Pertinent Positives:  · Pertinent Negatives:     Assessment and Plan:  Based on a thorough discussion of this condition and the management approach to it (including a comprehensive discussion of the known risks, side effects and potential benefits of treatment), the patient (family) agrees to implement the following specific plan:  · Patient is responding well to treatment  Continue phototherapy 2-3 times per week  · Continue topical treatment with protopic 0 1% ointment  Apply two times daily to areas of pigment loss  · Follow up in 2 months                 Phototherapy Initiation  Skin Type:  II     Skin type Typical features Tanning ability    I Pale white skin, blue/green eyes, blond/red hair Always burns, does not tan   II Fair skin, blue eyes Burns easily, tans poorly   III Darker white skin Tans after initial burn   IV Light brown skin Burns minimally, tans easily   V Brown skin Rarely burns, tans darkly easily   VI Dark brown or black skin Never burns, always tans darkly         Treatment Type:  UVB     Schedule:  3 X WEEK      Secondary Treatment:  No     Topical Application:  Mineral oil      Starting Mjoules:  Skin type I & II : as per most recent treatment ; maximum  2000 mJ     Increase by 5-10% at each treatment     Diagnosis: Vitiligo     Recommendations: Apply mineral oil prior to phototherapy  Continue treatment 2-3 times per week                          Scribe Attestation    I,:   am acting as a scribe while in the presence of the attending physician :       I,:  Jany Ibarra MD personally performed the services described in this documentation    as scribed in my presence :

## 2022-02-22 ENCOUNTER — OFFICE VISIT (OUTPATIENT)
Dept: DERMATOLOGY | Facility: CLINIC | Age: 37
End: 2022-02-22
Payer: COMMERCIAL

## 2022-02-22 DIAGNOSIS — L80 VITILIGO: Primary | ICD-10-CM

## 2022-02-22 PROCEDURE — 96900 ACTINOTHERAPY UV LIGHT: CPT | Performed by: DERMATOLOGY

## 2022-02-22 NOTE — PROGRESS NOTES
PHOTOTHERAPY NURSE VISIT    Physical Exam   Diagnosis: Vitiligo    Anatomic location affected: Bilateral forearms, hands, lower legs, feet    Plan:   Treatment Schedule: 2-3x/week   Reaction from previous therapy: None  · mJoules today: 1409 - dose held the same today due to pt missing 1 week of treatment; Typically would increase by 10%, maximum of 2000 mJ   Topical Applied: yes - mineral oil    Secondary Treatment: None  Based on a thorough discussion of this condition and the management approach to it (including a comprehensive discussion of the known risks, side effects and potential benefits of treatment), the patient (family) agrees to implement the following specific plan:    Patient wore appropriate eye protection, protective clothing to cover unaffected skin, approved footwear if needed   Topical was applied by patient     All questions were answered no complications reported   Patient's next scheduled appointment: 2/24/2022

## 2022-02-24 ENCOUNTER — OFFICE VISIT (OUTPATIENT)
Dept: DERMATOLOGY | Facility: CLINIC | Age: 37
End: 2022-02-24
Payer: COMMERCIAL

## 2022-02-24 DIAGNOSIS — L80 VITILIGO: Primary | ICD-10-CM

## 2022-02-24 PROCEDURE — 96900 ACTINOTHERAPY UV LIGHT: CPT | Performed by: DERMATOLOGY

## 2022-02-24 NOTE — PROGRESS NOTES
PHOTOTHERAPY NURSE VISIT    Physical Exam   Diagnosis: Vitiligo    Anatomic location affected: Bilateral forearms, hands, lower legs, and feet     Plan:   Treatment Schedule: 2-3x/week   Reaction from previous therapy: None   mJoules today: 1545 (Increased by 10%, maximum of 2000 mJ)    Topical Applied: yes - mineral oil    Secondary Treatment: None  Based on a thorough discussion of this condition and the management approach to it (including a comprehensive discussion of the known risks, side effects and potential benefits of treatment), the patient (family) agrees to implement the following specific plan:    Patient wore appropriate eye protection, protective clothing to cover unaffected skin, approved footwear if needed   Topical was applied by patient     All questions were answered no complications reported   Patient's next scheduled appointment: 3/1/2022

## 2022-02-28 ENCOUNTER — OFFICE VISIT (OUTPATIENT)
Dept: DERMATOLOGY | Facility: CLINIC | Age: 37
End: 2022-02-28
Payer: COMMERCIAL

## 2022-02-28 DIAGNOSIS — L80 VITILIGO: Primary | ICD-10-CM

## 2022-02-28 PROCEDURE — 96900 ACTINOTHERAPY UV LIGHT: CPT | Performed by: DERMATOLOGY

## 2022-02-28 NOTE — PROGRESS NOTES
PHOTOTHERAPY NURSE VISIT    Physical Exam   Diagnosis: Vitiligo    Anatomic location affected: Bilateral forearms, hands, lower legs, and feet      Plan:   Treatment Schedule: 2-3x/week   Reaction from previous therapy: None  · mJoules today: 4658  (Increased by 10%, maximum of 2000 mJ)    Topical Applied: yes - mineral oil    Secondary Treatment: None  Based on a thorough discussion of this condition and the management approach to it (including a comprehensive discussion of the known risks, side effects and potential benefits of treatment), the patient (family) agrees to implement the following specific plan:    Patient wore appropriate eye protection, protective clothing to cover unaffected skin, approved footwear if needed   Topical was applied by patient      All questions were answered no complications reported   Patient's next scheduled appointment: 3/2/2022

## 2022-03-02 ENCOUNTER — OFFICE VISIT (OUTPATIENT)
Dept: DERMATOLOGY | Facility: CLINIC | Age: 37
End: 2022-03-02
Payer: COMMERCIAL

## 2022-03-02 DIAGNOSIS — L80 VITILIGO: Primary | ICD-10-CM

## 2022-03-02 PROCEDURE — 96900 ACTINOTHERAPY UV LIGHT: CPT | Performed by: DERMATOLOGY

## 2022-03-02 NOTE — PROGRESS NOTES
PHOTOTHERAPY NURSE VISIT    Physical Exam   Diagnosis: Vitiligo    Anatomic location affected: Bilateral forearms, hands, lower legs, and feet     Plan:   Treatment Schedule: 2-3x/week   Reaction from previous therapy: None  · mJoules today: 1877  (Increased by 10%, maximum of 2000 mJ)    Topical Applied: yes - mineral oil    Secondary Treatment: None  Based on a thorough discussion of this condition and the management approach to it (including a comprehensive discussion of the known risks, side effects and potential benefits of treatment), the patient (family) agrees to implement the following specific plan:    Patient wore appropriate eye protection, protective clothing to cover unaffected skin, approved footwear if needed   Topical was applied by patient     All questions were answered no complications reported   Patient's next scheduled appointment: 3/7/2022

## 2022-03-07 ENCOUNTER — OFFICE VISIT (OUTPATIENT)
Dept: DERMATOLOGY | Facility: CLINIC | Age: 37
End: 2022-03-07
Payer: COMMERCIAL

## 2022-03-07 DIAGNOSIS — L80 VITILIGO: Primary | ICD-10-CM

## 2022-03-07 PROCEDURE — 96900 ACTINOTHERAPY UV LIGHT: CPT | Performed by: DERMATOLOGY

## 2022-03-07 NOTE — PROGRESS NOTES
PHOTOTHERAPY NURSE VISIT    Physical Exam   Diagnosis: Vitiligo    Anatomic location affected: Bilateral forearms, hands, lower legs, and feet     Plan:   Treatment Schedule: 2-3x/week   Reaction from previous therapy: None  · mJoules today: 2006 (Increased to pt's maximum dose of 2000 mJ)     Topical Applied: yes - mineral oil    Secondary Treatment: None  Based on a thorough discussion of this condition and the management approach to it (including a comprehensive discussion of the known risks, side effects and potential benefits of treatment), the patient (family) agrees to implement the following specific plan:    Patient wore appropriate eye protection, protective clothing to cover unaffected skin, approved footwear if needed   Topical was applied by patient     All questions were answered no complications reported   Patient's next scheduled appointment: 3/9/2022

## 2022-03-09 ENCOUNTER — OFFICE VISIT (OUTPATIENT)
Dept: DERMATOLOGY | Facility: CLINIC | Age: 37
End: 2022-03-09
Payer: COMMERCIAL

## 2022-03-09 DIAGNOSIS — L80 VITILIGO: Primary | ICD-10-CM

## 2022-03-09 PROCEDURE — 96900 ACTINOTHERAPY UV LIGHT: CPT | Performed by: DERMATOLOGY

## 2022-03-09 NOTE — PROGRESS NOTES
PHOTOTHERAPY NURSE VISIT     Physical Exam  · Diagnosis: Vitiligo   · Anatomic location affected: Bilateral forearms, hands, lower legs, and feet      Plan:  · Treatment Schedule: 2-3x/week  · Reaction from previous therapy: None  · mJoules today: 2001 - at maximum dose   · Topical Applied: yes - mineral oil   · Secondary Treatment: None         Based on a thorough discussion of this condition and the management approach to it (including a comprehensive discussion of the known risks, side effects and potential benefits of treatment), the patient (family) agrees to implement the following specific plan:  ·  Patient wore appropriate eye protection, protective clothing to cover unaffected skin, approved footwear if needed  · Topical was applied by patient    · All questions were answered no complications reported  · Patient's next scheduled appointment: 3/14/2022

## 2022-03-14 ENCOUNTER — OFFICE VISIT (OUTPATIENT)
Dept: DERMATOLOGY | Facility: CLINIC | Age: 37
End: 2022-03-14
Payer: COMMERCIAL

## 2022-03-14 DIAGNOSIS — L80 VITILIGO: Primary | ICD-10-CM

## 2022-03-14 PROCEDURE — 96900 ACTINOTHERAPY UV LIGHT: CPT | Performed by: DERMATOLOGY

## 2022-03-14 NOTE — PROGRESS NOTES
PHOTOTHERAPY NURSE VISIT    Physical Exam   Diagnosis: Vitiligo    Anatomic location affected: Bilateral forearms, hands, lower legs, and feet     Plan:   Treatment Schedule: 2-3x/week   Reaction from previous therapy: None   mJoules today: 2001 - at maximum dose    Topical Applied: yes - mineral oil   Secondary Treatment: None  Based on a thorough discussion of this condition and the management approach to it (including a comprehensive discussion of the known risks, side effects and potential benefits of treatment), the patient (family) agrees to implement the following specific plan:    Patient wore appropriate eye protection, protective clothing to cover unaffected skin, approved footwear if needed   Topical was applied by patient     All questions were answered no complications reported   Patient's next scheduled appointment: 3/16/2022

## 2022-03-16 ENCOUNTER — OFFICE VISIT (OUTPATIENT)
Dept: DERMATOLOGY | Facility: CLINIC | Age: 37
End: 2022-03-16

## 2022-03-16 DIAGNOSIS — L80 VITILIGO: Primary | ICD-10-CM

## 2022-03-16 PROCEDURE — NC001 PR NO CHARGE: Performed by: DERMATOLOGY

## 2022-03-16 NOTE — PROGRESS NOTES
Pt came in today to have her phototherapy treatment, however pt was NOT treated today  Upon examination, pt still had mild erythema present from last treatment  Pt stated after last treatment, she felt she developed a sunburn  Pt stated her skin was tender for about one day after the last treatment  It was determined it would be best for the pt to not be treated today to give her skin some time to heal  Pt instructed to moisture her skin 3x/day and to wait until erythema goes down until she gets treated again  Pt agreeable  Next dose will be decreased by 25%

## 2022-03-23 ENCOUNTER — OFFICE VISIT (OUTPATIENT)
Dept: DERMATOLOGY | Facility: CLINIC | Age: 37
End: 2022-03-23
Payer: COMMERCIAL

## 2022-03-23 DIAGNOSIS — L80 VITILIGO: Primary | ICD-10-CM

## 2022-03-23 PROCEDURE — 96900 ACTINOTHERAPY UV LIGHT: CPT | Performed by: DERMATOLOGY

## 2022-03-23 NOTE — PROGRESS NOTES
PHOTOTHERAPY NURSE VISIT     Physical Exam  · Diagnosis: Vitiligo   · Anatomic location affected: Bilateral forearms, hands, lower legs, and feet      Plan:  · Treatment Schedule: 2-3x/week  · Reaction from previous therapy: Erythema from visit on 3/14 that is no longer present  Today's dose was reduced by 25%  · mJoules today: 1503 - reduced by 25%  · Topical Applied: yes - mineral oil  · Secondary Treatment: None         Based on a thorough discussion of this condition and the management approach to it (including a comprehensive discussion of the known risks, side effects and potential benefits of treatment), the patient (family) agrees to implement the following specific plan:  ·  Patient wore appropriate eye protection, protective clothing to cover unaffected skin, approved footwear if needed  · Topical was applied by patient    · All questions were answered no complications reported  · Patient's next scheduled appointment: 3/28/2022

## 2022-03-28 ENCOUNTER — OFFICE VISIT (OUTPATIENT)
Dept: DERMATOLOGY | Facility: CLINIC | Age: 37
End: 2022-03-28
Payer: COMMERCIAL

## 2022-03-28 DIAGNOSIS — L80 VITILIGO: Primary | ICD-10-CM

## 2022-03-28 PROCEDURE — 96900 ACTINOTHERAPY UV LIGHT: CPT | Performed by: DERMATOLOGY

## 2022-03-28 NOTE — PROGRESS NOTES
PHOTOTHERAPY NURSE VISIT     Physical Exam  · Diagnosis: Vitiligo   · Anatomic location affected: Bilateral forearms, hands, lower legs, and feet      Plan:  · Treatment Schedule: 2-3x/week  · Reaction from previous therapy: None  · mJoules today: 1505 - dose held the same; Typically would increase by 10% to a maximum of 2000 mJ)  · Topical Applied: yes - mineral oil  · Secondary Treatment: None         Based on a thorough discussion of this condition and the management approach to it (including a comprehensive discussion of the known risks, side effects and potential benefits of treatment), the patient (family) agrees to implement the following specific plan:  ·  Patient wore appropriate eye protection, protective clothing to cover unaffected skin, approved footwear if needed  · Topical was applied by patient    · All questions were answered no complications reported  · Patient's next scheduled appointment: 3/30/2022

## 2022-03-30 ENCOUNTER — OFFICE VISIT (OUTPATIENT)
Dept: DERMATOLOGY | Facility: CLINIC | Age: 37
End: 2022-03-30
Payer: COMMERCIAL

## 2022-03-30 DIAGNOSIS — L80 VITILIGO: Primary | ICD-10-CM

## 2022-03-30 PROCEDURE — 96910 PHOTCHMTX TAR&UVB/PTRLTM&UVB: CPT | Performed by: DERMATOLOGY

## 2022-03-30 NOTE — PROGRESS NOTES
INTERNAL MED PROGRESS NOTE    Deep Sanabria Jr is a 89 year old male patient.    Subjective:    On Bipap  On Precedex for severe agitation and trying to pull out the bipap mask and iv lines.  Wrist restraints in place  Hypoxic; tachypneic  Intermittent bradycardia sec to above    Objective:    Current Vitals:  Visit Vitals  BP (!) 182/165   Pulse (!) 55   Temp 96.8 °F (36 °C) (Axillary)   Resp (!) 29   Ht 5' 10\" (1.778 m)   Wt 93.2 kg (205 lb 7.5 oz)   SpO2 99%   BMI 29.48 kg/m²     HEENT: right eye perrla. Left eye blind. no icterus; no thrush  Cardiovascular: S1 S2  Pulmonary : lungs clear b/l  Abdominal: Soft. Bowel sounds are normal. Not distended. No masses or OM  Extremities : no edema. Good distal pulses. No cyanosis          A/p:        1) unwitnessed fall sec to weakness      2) Covid 19 infection with respiratory failure : continue decadron . lovenox dvt prophylaxis dose.  Holding remdesivir sec to CKD. recieved Toci 12/28/21.   On zosyn + doxy   Trend markers     3) progressive memory loss . Neuro recs noted. outpt eval  When more stable     4)  DM-2 with hyperglycemia and  peripheral neuropathy : hold home meds; lantus bid while here and on steroids; SSI coverage. Increase lantus dose.  A1c 10.5     5) CAD s/p stents; h/o MI     6) h/o Aortic stenosis underwent  TAVR with dr Ramakrishna Nuñez at [facility] in 2019.After the TAVR procedure, patient had a complete heart block and underwent transvenouspacemaker.Patient spontaneously converted back to the sinus rhythm and did not have any episode of heart block.     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 Repeat thyroid panel in 6 weeks     13) h/o prostate ca     14)  PHOTOTHERAPY NURSE VISIT     Physical Exam  · Diagnosis: Vitiligo   · Anatomic location affected: Bilateral forearms, hands, lower legs, and feet      Plan:  · Treatment Schedule: 2-3x/week  · Reaction from previous therapy: None  · mJoules today: 1582 - dose increased by 5% to be cautious due to mild erythema occurring two treatments ago; Typically would increase by 10% to a maximum of 2000 mJ  · Topical Applied: yes - mineral oil  · Secondary Treatment: None         Based on a thorough discussion of this condition and the management approach to it (including a comprehensive discussion of the known risks, side effects and potential benefits of treatment), the patient (family) agrees to implement the following specific plan:  ·  Patient wore appropriate eye protection, protective clothing to cover unaffected skin, approved footwear if needed  · Topical was applied by patient    · All questions were answered no complications reported  · Patient's next scheduled appointment: 4/4/2022 chronic urinary urge incontinence    15) acute toxic encephalopathy sec to covid    16) NSVT     17) hypoalbuminemia     Full code          I/O Summary:    Intake/Output Summary (Last 24 hours) at 12/31/2021 2257  Last data filed at 12/31/2021 2100  Gross per 24 hour   Intake 3690.22 ml   Output 940 ml   Net 2750.22 ml       Admit Weight: 97 kg (213 lb 13.5 oz)   Current Weight: 93.2 kg (205 lb 7.5 oz)    Medications:  Continuous Medications:  • AMIODarone (CORDARONE) 450 mg/250 mL dextrose 5% infusion 0.5 mg/min (12/31/21 2058)   • PHENYLephrine (LUPIS-SYNEPHRINE) 50 mg/250 mL in sodium chloride 0.9 % infusion 25 mcg/min (12/31/21 2245)   • lactated ringers infusion 100 mL/hr at 12/31/21 2032   • dexMEDETomidine (PRECEDEX) 400 mcg/100 mL in sodium chloride 0.9 % infusion 0.4 mcg/kg/hr (12/31/21 2045)       Scheduled Medications:  • enoxaparin  1 mg/kg Subcutaneous 2 times per day   • insulin lispro   Subcutaneous 4 times per day   • dexamethasone  10 mg Intravenous Daily   • albuterol  4 puff Inhalation TID Resp   • budesonide-formoterol  2 puff Inhalation BID Resp   • montelukast  10 mg Oral Q Evening   • piperacillin-tazobactam (ZOSYN) extended interval IVPB  3.375 g Intravenous 3 times per day   • doxycycline  100 mg Intravenous 2 times per day   • insulin glargine  11 Units Subcutaneous 2 times per day   • levothyroxine  175 mcg Oral QAM AC   • atorvastatin  40 mg Oral Daily   • prednisoLONE acetate  1 drop Left Eye BID   • dorzolamide  1 drop Left Eye BID   • latanoprost  1 drop Both Eyes Nightly   • lisinopril  20 mg Oral Daily       PRN Medications  hydrALAZINE, LORazepam, ondansetron (ZOFRAN) parenteral, acetaminophen, morphine injection, dextrose, dextrose, glucagon, dextrose, dextrose    LABS:   Recent Labs   Lab 12/31/21  1019 12/31/21  0330 12/30/21  0335 12/29/21  1104 12/29/21  1104   SODIUM 153* 150* 148*   < > 148*   CHLORIDE 117* 116* 115*   < > 111*   CO2 26 27 27   < > 27   BUN 42* 45* 38*   < >  39*   CREATININE 1.32* 1.36* 1.24*   < > 1.21*   CALCIUM 7.9* 7.9* 7.8*   < > 8.6   ALBUMIN 1.9* 1.8*  --   --  2.4*   BILIRUBIN 0.5 0.3  --   --  0.7   ALKPT 96 75  --   --  72   GPT 44 44  --   --  64*   AST 50* 47*  --   --  125*   GLUCOSE 136* 233* 242*   < > 240*    < > = values in this interval not displayed.     Recent Labs   Lab 12/30/21  1003   WBC 7.5   RBC 4.01*   HGB 11.8*   HCT 37.1*        Recent Labs   Lab 12/25/21  1653   CHOLESTEROL 115     Results for orders placed or performed during the hospital encounter of 12/25/21   Electrocardiogram 12-Lead   Result Value Ref Range    Ventricular Rate EKG/Min (BPM) 79     Atrial Rate (BPM) 79     RI-Interval (MSEC) 162     QRS-Interval (MSEC) 152     QT-Interval (MSEC) 430     QTc 493     P Axis (Degrees) 62     R Axis (Degrees) -79     T Axis (Degrees) 37     REPORT TEXT       Normal sinus rhythm  Left axis deviation  Right bundle branch block  Possible  Lateral infarct  , age undetermined  Inferior infarct  , age undetermined  Nonspecific T wave abnormality  Abnormal ECG  No previous ECGs available  Confirmed by [facility] ([id number]) on 12/28/2021 7:44:17 AM             Jose Manuel Khan MD   12/31/2021 10:57 PM

## 2022-04-04 ENCOUNTER — OFFICE VISIT (OUTPATIENT)
Dept: DERMATOLOGY | Facility: CLINIC | Age: 37
End: 2022-04-04
Payer: COMMERCIAL

## 2022-04-04 DIAGNOSIS — L80 VITILIGO: Primary | ICD-10-CM

## 2022-04-04 PROCEDURE — 96900 ACTINOTHERAPY UV LIGHT: CPT | Performed by: DERMATOLOGY

## 2022-04-04 NOTE — PROGRESS NOTES
PHOTOTHERAPY NURSE VISIT     Physical Exam  · Diagnosis: Vitiligo   · Anatomic location affected: Bilateral forearms, hands, lower legs, and feet      Plan:  · Treatment Schedule: 2-3x/week  · Reaction from previous therapy: None  · mJoules today: 1739 (Increased by 10%,  maximum of 2000 mJ)  · Topical Applied: yes - mineral oil  · Secondary Treatment: None         Based on a thorough discussion of this condition and the management approach to it (including a comprehensive discussion of the known risks, side effects and potential benefits of treatment), the patient (family) agrees to implement the following specific plan:  ·  Patient wore appropriate eye protection, protective clothing to cover unaffected skin, approved footwear if needed  · Topical was applied by patient    · All questions were answered no complications reported  · Patient's next scheduled appointment: 4/6/2022

## 2022-04-06 ENCOUNTER — OFFICE VISIT (OUTPATIENT)
Dept: DERMATOLOGY | Facility: CLINIC | Age: 37
End: 2022-04-06
Payer: COMMERCIAL

## 2022-04-06 DIAGNOSIS — L80 VITILIGO: Primary | ICD-10-CM

## 2022-04-06 PROCEDURE — 96910 PHOTCHMTX TAR&UVB/PTRLTM&UVB: CPT | Performed by: DERMATOLOGY

## 2022-04-06 NOTE — PROGRESS NOTES
PHOTOTHERAPY NURSE VISIT     Physical Exam  · Diagnosis: Vitiligo   · Anatomic location affected: Bilateral forearms, hands, lower legs, and feet      Plan:  · Treatment Schedule: 2-3x/week  · Reaction from previous therapy: None  · mJoules today: 1915 (Increased by 10%,  maximum of 2000 mJ)  · Topical Applied: yes - mineral oil  · Secondary Treatment: None         Based on a thorough discussion of this condition and the management approach to it (including a comprehensive discussion of the known risks, side effects and potential benefits of treatment), the patient (family) agrees to implement the following specific plan:  ·  Patient wore appropriate eye protection, protective clothing to cover unaffected skin, approved footwear if needed  · Topical was applied by patient    · All questions were answered no complications reported  · Patient's next scheduled appointment: 4/11/2022

## 2022-04-11 ENCOUNTER — OFFICE VISIT (OUTPATIENT)
Dept: DERMATOLOGY | Facility: CLINIC | Age: 37
End: 2022-04-11
Payer: COMMERCIAL

## 2022-04-11 DIAGNOSIS — L80 VITILIGO: Primary | ICD-10-CM

## 2022-04-11 PROCEDURE — 96910 PHOTCHMTX TAR&UVB/PTRLTM&UVB: CPT | Performed by: DERMATOLOGY

## 2022-04-11 NOTE — PROGRESS NOTES
PHOTOTHERAPY NURSE VISIT     Physical Exam  · Diagnosis: Vitiligo   · Anatomic location affected: Bilateral forearms, hands, lower legs, and feet      Plan:  · Treatment Schedule: 2-3x/week  · Reaction from previous therapy: None  · mJoules today: 2003 - Increased to maximum dose of 2000 mJ  · Topical Applied: yes - mineral oil  · Secondary Treatment: None         Based on a thorough discussion of this condition and the management approach to it (including a comprehensive discussion of the known risks, side effects and potential benefits of treatment), the patient (family) agrees to implement the following specific plan:  ·  Patient wore appropriate eye protection, protective clothing to cover unaffected skin, approved footwear if needed  · Topical was applied by patient    · All questions were answered no complications reported  · Patient's next scheduled appointment: 4/13/2022

## 2022-04-13 ENCOUNTER — OFFICE VISIT (OUTPATIENT)
Dept: DERMATOLOGY | Facility: CLINIC | Age: 37
End: 2022-04-13
Payer: COMMERCIAL

## 2022-04-13 DIAGNOSIS — L80 VITILIGO: Primary | ICD-10-CM

## 2022-04-13 PROCEDURE — 96910 PHOTCHMTX TAR&UVB/PTRLTM&UVB: CPT | Performed by: DERMATOLOGY

## 2022-04-13 NOTE — PROGRESS NOTES
PHOTOTHERAPY NURSE VISIT     Physical Exam  · Diagnosis: Vitiligo   · Anatomic location affected: Bilateral forearms, hands, lower legs, and feet      Plan:  · Treatment Schedule: 2-3x/week  · Reaction from previous therapy: None  · mJoules today: 2000 - at maximum dose   · Topical Applied: yes - mineral oil  · Secondary Treatment: None         Based on a thorough discussion of this condition and the management approach to it (including a comprehensive discussion of the known risks, side effects and potential benefits of treatment), the patient (family) agrees to implement the following specific plan:  ·  Patient wore appropriate eye protection, protective clothing to cover unaffected skin, approved footwear if needed  · Topical was applied by patient    · All questions were answered no complications reported  · Patient's next scheduled appointment: Pt has a follow up with the provider on 4/20/2022

## 2022-04-20 ENCOUNTER — OFFICE VISIT (OUTPATIENT)
Dept: MULTI SPECIALTY CLINIC | Facility: CLINIC | Age: 37
End: 2022-04-20

## 2022-04-20 VITALS — BODY MASS INDEX: 27.34 KG/M2 | WEIGHT: 140 LBS

## 2022-04-20 DIAGNOSIS — L80 VITILIGO: Primary | ICD-10-CM

## 2022-04-20 PROCEDURE — 99213 OFFICE O/P EST LOW 20 MIN: CPT | Performed by: DERMATOLOGY

## 2022-04-20 NOTE — PROGRESS NOTES
[facility] Follow Up Note    Patient Name: Zoila Pizano  Encounter Date: 04/20/22      Today's Chief Concerns:  Christopher Pablo Concern #1:  Follow up       Current Medications:    Current Outpatient Medications:     Accu-Chek FastClix Lancets MISC, USE TO TEST BLOOD SUGAR 6 TIMES A DAY, Disp: , Rfl:     Accu-Chek Guide test strip, USE UP TO 6 TIMES A DAY, Disp: , Rfl:     Biotin 1 MG CAPS, Take by mouth, Disp: , Rfl:     calcium carbonate-vitamin D (OSCAL-D) 500 mg-200 units per tablet, Take 1 tablet by mouth 3 (three) times a day with meals, Disp: 90 tablet, Rfl: 0    insulin aspart (NovoLOG) 100 units/mL injection, Inject under the skin continuous  Via pump, Disp: , Rfl:     insulin lispro (Admelog) 100 units/mL injection, INJECT 60 UNITS VIA INSULIN PUMP DAILY, Disp: , Rfl:     multivitamin (THERAGRAN) TABS, Take 1 tablet by mouth daily, Disp: , Rfl:     naproxen (NAPROSYN) 500 mg tablet, Take 500 mg by mouth, Disp: , Rfl:     tacrolimus (PROTOPIC) 0 1 % ointment, Apply topically 2 (two) times a day, Disp: 60 g, Rfl: 1    CONSTITUTIONAL:   Vitals:    04/20/22 0933   Weight: 63 5 kg (140 lb)       Specific Alerts:    Have you been seen by a [facility] Dermatologist in the last 3 years? YES    Are you pregnant or planning to become pregnant? No    Are you currently or planning to be nursing or breast feeding? No    Allergies   Allergen Reactions    Augmentin [Amoxicillin-Pot Clavulanate] Rash       May we call your Preferred Phone number to discuss your specific medical information? YES    May we leave a detailed message that includes your specific medical information? YES    Have you traveled outside of the [facility] in the past 3 months? No    Do you currently have a pacemaker or defibrillator? No    Do you have any artificial heart valves, joints, plates, screws, rods, stents, pins, etc? No    Do you require any medications prior to a surgical procedure?  No    Are you taking any medications that cause you to bleed more easily ("blood thinners") No    Have you ever experienced a rapid heartbeat with epinephrine? No        Review of Systems:  Have you recently had or currently have any of the following?     · Fever or chills: No  · Night Sweats: No  · Headaches: No  · Weight Gain: No  · Weight Loss: No  · Blurry Vision: No  · Nausea: No  · Vomiting: No  · Diarrhea: No  · Blood in Stool: No  · Abdominal Pain: No  · Itchy Skin: No  · Painful Joints: No  · Swollen Joints: No  · Muscle Pain: No  · Irregular Mole: No  · Sun Burn: No  · Dry Skin: No  · Skin Color Changes: No  · Scar or Keloid: No  · Cold Sores/Fever Blisters: No  · Bacterial Infections/MRSA: No  · Anxiety: No  · Depression: No  · Suicidal or Homicidal Thoughts: No      PSYCH: Normal mood and affect  EYES: Normal conjunctiva  ENT: Normal lips and oral mucosa  CARDIOVASCULAR: No edema  RESPIRATORY: Normal respirations  HEME/LYMPH/IMMUNO:  No regional lymphadenopathy except as noted below in ASSESSMENT AND PLAN BY DIAGNOSIS    FULL ORGAN SYSTEM SKIN EXAM (SKIN)   Face Normal except as noted below in Assessment   Neck Normal except as noted below in Assessment   Right Arm/Hand/Fingers Normal except as noted below in Assessment   Left Arm/Hand/Fingers Normal except as noted below in Assessment   Right Leg, Foot, Toes Normal except as noted below in Assessment   Left Leg, Foot, Toes Normal except as noted below in Assessment            VITILIGO     Physical Exam:  · Anatomic Location Affected:  Bilateral forearms, hands, lower legs, feet  · Morphological Description:  Depigmented patches with perifollicular repigmentation on forearms, dorsal hands and fingers, and lower legs, improved compared to prior  · Pertinent Positives:  · Pertinent Negatives:     Assessment and Plan:  Based on a thorough discussion of this condition and the management approach to it (including a comprehensive discussion of the known risks, side effects and potential benefits of treatment), the patient (family) agrees to implement the following specific plan:  § Patient is responding well to treatment  Continue phototherapy 2-3 times per week  § Continue topical treatment with protopic 0 1% ointment  Apply two times daily to areas of pigment loss  § Follow up in 3 months                 Phototherapy Initiation  Skin Type:  II     Skin type Typical features Tanning ability    I Pale white skin, blue/green eyes, blond/red hair Always burns, does not tan   II Fair skin, blue eyes Burns easily, tans poorly   III Darker white skin Tans after initial burn   IV Light brown skin Burns minimally, tans easily   V Brown skin Rarely burns, tans darkly easily   VI Dark brown or black skin Never burns, always tans darkly         Treatment Type:  UVB     Schedule:  3 X WEEK      Secondary Treatment:  No     Topical Application:  Mineral oil      Starting Mjoules:  Skin type I & II : as per most recent treatment ; maximum  2000 mJ     Increase by 5-10% at each treatment     Diagnosis: Vitiligo     Recommendations: Apply mineral oil prior to phototherapy   Continue treatment 2-3 times per week

## 2022-04-25 ENCOUNTER — OFFICE VISIT (OUTPATIENT)
Dept: DERMATOLOGY | Facility: CLINIC | Age: 37
End: 2022-04-25
Payer: COMMERCIAL

## 2022-04-25 DIAGNOSIS — L80 VITILIGO: Primary | ICD-10-CM

## 2022-04-25 PROCEDURE — 96910 PHOTCHMTX TAR&UVB/PTRLTM&UVB: CPT | Performed by: DERMATOLOGY

## 2022-04-25 NOTE — PROGRESS NOTES
PHOTOTHERAPY NURSE VISIT     Physical Exam  · Diagnosis: Vitiligo   · Anatomic location affected: Bilateral forearms, hands, lower legs, and feet      Plan:  · Treatment Schedule: 2-3x/week  · Reaction from previous therapy: None  · mJoules today: 1506 - dose decreased by 25% due to pt missing two weeks of treatment; Typically would increase by 10% to maximum dose of 2000 mJ  · Topical Applied: yes - mineral oil  · Secondary Treatment: None         Based on a thorough discussion of this condition and the management approach to it (including a comprehensive discussion of the known risks, side effects and potential benefits of treatment), the patient (family) agrees to implement the following specific plan:  ·  Patient wore appropriate eye protection, protective clothing to cover unaffected skin, approved footwear if needed  · Topical was applied by patient    · All questions were answered no complications reported  · Patient's next scheduled appointment: 4/27/2022

## 2022-04-27 ENCOUNTER — OFFICE VISIT (OUTPATIENT)
Dept: DERMATOLOGY | Facility: CLINIC | Age: 37
End: 2022-04-27
Payer: COMMERCIAL

## 2022-04-27 DIAGNOSIS — L80 VITILIGO: Primary | ICD-10-CM

## 2022-04-27 PROCEDURE — 96910 PHOTCHMTX TAR&UVB/PTRLTM&UVB: CPT | Performed by: DERMATOLOGY

## 2022-04-27 NOTE — PROGRESS NOTES
PHOTOTHERAPY NURSE VISIT     Physical Exam  · Diagnosis: Vitiligo   · Anatomic location affected: Bilateral forearms, hands, lower legs, and feet      Plan:  · Treatment Schedule: 2-3x/week  · Reaction from previous therapy: None  · mJoules today: 1661 - increase by 10% to maximum dose of 2000 mJ  · Topical Applied: yes - mineral oil  · Secondary Treatment: None         Based on a thorough discussion of this condition and the management approach to it (including a comprehensive discussion of the known risks, side effects and potential benefits of treatment), the patient (family) agrees to implement the following specific plan:  ·  Patient wore appropriate eye protection, protective clothing to cover unaffected skin, approved footwear if needed  · Topical was applied by patient    · All questions were answered no complications reported  · Patient's next scheduled appointment: 5/2/2022

## 2022-05-02 ENCOUNTER — OFFICE VISIT (OUTPATIENT)
Dept: DERMATOLOGY | Facility: CLINIC | Age: 37
End: 2022-05-02
Payer: COMMERCIAL

## 2022-05-02 DIAGNOSIS — L80 VITILIGO: Primary | ICD-10-CM

## 2022-05-02 PROCEDURE — 96910 PHOTCHMTX TAR&UVB/PTRLTM&UVB: CPT | Performed by: DERMATOLOGY

## 2022-05-04 ENCOUNTER — OFFICE VISIT (OUTPATIENT)
Dept: DERMATOLOGY | Facility: CLINIC | Age: 37
End: 2022-05-04
Payer: COMMERCIAL

## 2022-05-04 DIAGNOSIS — L80 VITILIGO: Primary | ICD-10-CM

## 2022-05-04 PROCEDURE — 96910 PHOTCHMTX TAR&UVB/PTRLTM&UVB: CPT | Performed by: DERMATOLOGY

## 2022-05-04 NOTE — PROGRESS NOTES
PHOTOTHERAPY NURSE VISIT     Physical Exam  · Diagnosis: Vitiligo   · Anatomic location affected: Bilateral forearms, hands, lower legs, and feet      Plan:  · Treatment Schedule: 2-3x/week  · Reaction from previous therapy: Mild erythema present under pt's arms  No pain or itching  These areas will not be treated today  Dose held the same  · mJoules today: 1830 - dose held the same; Typically would increase by 10% to maximum dose of 2000 mJ  · Topical Applied: yes - mineral oil  · Secondary Treatment: None         Based on a thorough discussion of this condition and the management approach to it (including a comprehensive discussion of the known risks, side effects and potential benefits of treatment), the patient (family) agrees to implement the following specific plan:  ·  Patient wore appropriate eye protection, protective clothing to cover unaffected skin, approved footwear if needed  · Topical was applied by patient    · All questions were answered no complications reported  · Patient's next scheduled appointment: 5/9/2022

## 2022-05-11 ENCOUNTER — OFFICE VISIT (OUTPATIENT)
Dept: DERMATOLOGY | Facility: CLINIC | Age: 37
End: 2022-05-11
Payer: COMMERCIAL

## 2022-05-11 DIAGNOSIS — L80 VITILIGO: Primary | ICD-10-CM

## 2022-05-11 PROCEDURE — 96910 PHOTCHMTX TAR&UVB/PTRLTM&UVB: CPT | Performed by: DERMATOLOGY

## 2022-05-11 NOTE — PROGRESS NOTES
PHOTOTHERAPY NURSE VISIT     Physical Exam  · Diagnosis: Vitiligo   · Anatomic location affected: Bilateral forearms, hands, lower legs, and feet      Plan:  · Treatment Schedule: 2-3x/week  · Reaction from previous therapy: Mild erythema present under pt's arms  No pain or itching  These areas will not be treated today  Dose held the same  · mJoules today: 1834 - dose held the same due to pt missing one week of treatment; Typically would increase by 10% to maximum dose of 2000 mJ  · Topical Applied: yes - mineral oil  · Secondary Treatment: None         Based on a thorough discussion of this condition and the management approach to it (including a comprehensive discussion of the known risks, side effects and potential benefits of treatment), the patient (family) agrees to implement the following specific plan:  ·  Patient wore appropriate eye protection, protective clothing to cover unaffected skin, approved footwear if needed  · Topical was applied by patient    · All questions were answered no complications reported  · Patient's next scheduled appointment: 5/16/2022

## 2022-05-18 ENCOUNTER — OFFICE VISIT (OUTPATIENT)
Dept: DERMATOLOGY | Facility: CLINIC | Age: 37
End: 2022-05-18
Payer: COMMERCIAL

## 2022-05-18 DIAGNOSIS — L80 VITILIGO: Primary | ICD-10-CM

## 2022-05-18 PROCEDURE — 96910 PHOTCHMTX TAR&UVB/PTRLTM&UVB: CPT | Performed by: DERMATOLOGY

## 2022-05-18 NOTE — PROGRESS NOTES
PHOTOTHERAPY NURSE VISIT     Physical Exam  · Diagnosis: Vitiligo   · Anatomic location affected: Bilateral forearms, hands, lower legs, and feet      Plan:  · Treatment Schedule: 2-3x/week  · Reaction from previous therapy: None  · mJoules today: 1838 - dose held the same due to pt missing one week of treatment; Typically would increase by 10% to maximum dose of 2000 mJ  · Topical Applied: yes - mineral oil  · Secondary Treatment: None         Based on a thorough discussion of this condition and the management approach to it (including a comprehensive discussion of the known risks, side effects and potential benefits of treatment), the patient (family) agrees to implement the following specific plan:  ·  Patient wore appropriate eye protection, protective clothing to cover unaffected skin, approved footwear if needed  · Topical was applied by patient    · All questions were answered no complications reported  · Patient's next scheduled appointment: 6/6/2022

## 2022-06-06 ENCOUNTER — OFFICE VISIT (OUTPATIENT)
Dept: DERMATOLOGY | Facility: CLINIC | Age: 37
End: 2022-06-06
Payer: COMMERCIAL

## 2022-06-06 DIAGNOSIS — L80 VITILIGO: Primary | ICD-10-CM

## 2022-06-06 PROCEDURE — 96910 PHOTCHMTX TAR&UVB/PTRLTM&UVB: CPT | Performed by: DERMATOLOGY

## 2022-06-06 NOTE — PROGRESS NOTES
PHOTOTHERAPY NURSE VISIT     Physical Exam  · Diagnosis: Vitiligo   · Anatomic location affected: Bilateral forearms, hands, lower legs, and feet      Plan:  · Treatment Schedule: 2-3x/week  · Reaction from previous therapy: None  · mJoules today: 1380 - dose decreased by 25% due to pt missing 2 weeks of treatment; Typically would increase by 10% to maximum dose of 2000 mJ  · Topical Applied: yes - mineral oil  · Secondary Treatment: None         Based on a thorough discussion of this condition and the management approach to it (including a comprehensive discussion of the known risks, side effects and potential benefits of treatment), the patient (family) agrees to implement the following specific plan:  ·  Patient wore appropriate eye protection, protective clothing to cover unaffected skin, approved footwear if needed  · Topical was applied by patient    · All questions were answered no complications reported  · Patient's next scheduled appointment: 6/8/2022

## 2022-06-08 ENCOUNTER — OFFICE VISIT (OUTPATIENT)
Dept: DERMATOLOGY | Facility: CLINIC | Age: 37
End: 2022-06-08
Payer: COMMERCIAL

## 2022-06-08 DIAGNOSIS — L80 VITILIGO: Primary | ICD-10-CM

## 2022-06-08 PROCEDURE — 96910 PHOTCHMTX TAR&UVB/PTRLTM&UVB: CPT | Performed by: DERMATOLOGY

## 2022-06-08 NOTE — PROGRESS NOTES
PHOTOTHERAPY NURSE VISIT     Physical Exam  · Diagnosis: Vitiligo   · Anatomic location affected: Bilateral forearms, hands, lower legs, and feet      Plan:  · Treatment Schedule: 2-3x/week  · Reaction from previous therapy: None  · mJoules today: 1523 -  (increased by 10% to maximum dose of 2000 mJ)  · Topical Applied: yes - mineral oil  · Secondary Treatment: None         Based on a thorough discussion of this condition and the management approach to it (including a comprehensive discussion of the known risks, side effects and potential benefits of treatment), the patient (family) agrees to implement the following specific plan:  ·  Patient wore appropriate eye protection, protective clothing to cover unaffected skin, approved footwear if needed  · Topical was applied by patient    · All questions were answered no complications reported  · Patient's next scheduled appointment: 6/13/2022

## 2022-06-13 ENCOUNTER — OFFICE VISIT (OUTPATIENT)
Dept: DERMATOLOGY | Facility: CLINIC | Age: 37
End: 2022-06-13
Payer: COMMERCIAL

## 2022-06-13 DIAGNOSIS — L80 VITILIGO: Primary | ICD-10-CM

## 2022-06-13 PROCEDURE — 96910 PHOTCHMTX TAR&UVB/PTRLTM&UVB: CPT | Performed by: DERMATOLOGY

## 2022-06-13 NOTE — PROGRESS NOTES
PHOTOTHERAPY NURSE VISIT     Physical Exam  · Diagnosis: Vitiligo   · Anatomic location affected: Bilateral forearms, hands, lower legs, and feet      Plan:  · Treatment Schedule: 2-3x/week  · Reaction from previous therapy: None  · mJoules today: 1677  (Increased by 10% to maximum dose of 2000 mJ)  · Topical Applied: yes - mineral oil  · Secondary Treatment: None         Based on a thorough discussion of this condition and the management approach to it (including a comprehensive discussion of the known risks, side effects and potential benefits of treatment), the patient (family) agrees to implement the following specific plan:  ·  Patient wore appropriate eye protection, protective clothing to cover unaffected skin, approved footwear if needed  · Topical was applied by patient    · All questions were answered no complications reported  · Patient's next scheduled appointment: 6/15/2022

## 2022-06-15 ENCOUNTER — OFFICE VISIT (OUTPATIENT)
Dept: DERMATOLOGY | Facility: CLINIC | Age: 37
End: 2022-06-15
Payer: COMMERCIAL

## 2022-06-15 DIAGNOSIS — L80 VITILIGO: Primary | ICD-10-CM

## 2022-06-15 PROCEDURE — 96910 PHOTCHMTX TAR&UVB/PTRLTM&UVB: CPT | Performed by: DERMATOLOGY

## 2022-06-15 NOTE — PROGRESS NOTES
PHOTOTHERAPY    Treatment type: Phototherapy  Diagnosis: Vitiligo  Anatomical location: Lower extremity, Upper extremity  Treatment schedule: 2x weekly  Reaction: None  Increase %: 10%  mJoules: 1848  Max dose: 2000  Topical: Mineral oil  Topical applied by: Assistant  Special shield: Laurie  Tech: Leonardo ELLER  Comments: Next appt: 7/6 (pt going out of town)

## 2023-10-09 NOTE — PROGRESS NOTES
PHOTOTHERAPY NURSE VISIT     Physical Exam  · Diagnosis: Vitiligo   · Anatomic location affected: Bilateral forearms, hands, lower legs, and feet      Plan:  · Treatment Schedule: 2-3x/week  · Reaction from previous therapy: None  · mJoules today: 1828 - increase by 10% to maximum dose of 2000 mJ  · Topical Applied: yes - mineral oil  · Secondary Treatment: None         Based on a thorough discussion of this condition and the management approach to it (including a comprehensive discussion of the known risks, side effects and potential benefits of treatment), the patient (family) agrees to implement the following specific plan:  ·  Patient wore appropriate eye protection, protective clothing to cover unaffected skin, approved footwear if needed  · Topical was applied by patient    · All questions were answered no complications reported  · Patient's next scheduled appointment: 5/4/2022 (real-time) audio-video encounter. The patient (or guardian if applicable) is aware that this is a billable service, which includes applicable co-pays. This Virtual Visit was conducted with patient's (and/or legal guardian's) consent. Patient identification was verified, and a caregiver was present when appropriate. The patient was located at Home: [address] [place] [address], [address] [place], [place] [address]  Provider was located at Other: [address] [place] [address] on 10/9/2023 at 5:23 PM    An electronic signature was used to authenticate this note.